# Patient Record
Sex: FEMALE | Race: WHITE | NOT HISPANIC OR LATINO
[De-identification: names, ages, dates, MRNs, and addresses within clinical notes are randomized per-mention and may not be internally consistent; named-entity substitution may affect disease eponyms.]

---

## 2017-04-04 ENCOUNTER — NON-APPOINTMENT (OUTPATIENT)
Age: 52
End: 2017-04-04

## 2017-04-04 ENCOUNTER — APPOINTMENT (OUTPATIENT)
Dept: CARDIOLOGY | Facility: CLINIC | Age: 52
End: 2017-04-04

## 2017-04-04 VITALS
BODY MASS INDEX: 25.34 KG/M2 | HEART RATE: 68 BPM | HEIGHT: 63 IN | WEIGHT: 143 LBS | SYSTOLIC BLOOD PRESSURE: 118 MMHG | DIASTOLIC BLOOD PRESSURE: 70 MMHG

## 2017-04-04 DIAGNOSIS — Z00.00 ENCOUNTER FOR GENERAL ADULT MEDICAL EXAMINATION W/OUT ABNORMAL FINDINGS: ICD-10-CM

## 2017-04-04 DIAGNOSIS — Z82.5 FAMILY HISTORY OF ASTHMA AND OTHER CHRONIC LOWER RESPIRATORY DISEASES: ICD-10-CM

## 2017-04-04 DIAGNOSIS — Z78.9 OTHER SPECIFIED HEALTH STATUS: ICD-10-CM

## 2017-04-04 DIAGNOSIS — Z80.3 FAMILY HISTORY OF MALIGNANT NEOPLASM OF BREAST: ICD-10-CM

## 2017-04-04 DIAGNOSIS — D18.03 HEMANGIOMA OF INTRA-ABDOMINAL STRUCTURES: ICD-10-CM

## 2017-04-04 DIAGNOSIS — Z86.69 PERSONAL HISTORY OF OTHER DISEASES OF THE NERVOUS SYSTEM AND SENSE ORGANS: ICD-10-CM

## 2017-04-04 DIAGNOSIS — Z87.19 PERSONAL HISTORY OF OTHER DISEASES OF THE DIGESTIVE SYSTEM: ICD-10-CM

## 2017-04-04 DIAGNOSIS — J45.909 UNSPECIFIED ASTHMA, UNCOMPLICATED: ICD-10-CM

## 2017-04-04 RX ORDER — PHENTERMINE AND TOPIRAMATE 11.25; 69 MG/1; MG/1
11.25-69 CAPSULE, EXTENDED RELEASE ORAL
Refills: 0 | Status: ACTIVE | COMMUNITY

## 2017-04-04 RX ORDER — NAPROXEN 500 MG/1
500 TABLET ORAL
Refills: 0 | Status: ACTIVE | COMMUNITY

## 2017-04-04 RX ORDER — MONTELUKAST SODIUM 10 MG/1
10 TABLET, FILM COATED ORAL
Refills: 0 | Status: ACTIVE | COMMUNITY

## 2017-04-04 RX ORDER — OMEPRAZOLE 40 MG/1
40 CAPSULE, DELAYED RELEASE ORAL
Refills: 0 | Status: ACTIVE | COMMUNITY

## 2017-04-04 RX ORDER — FEXOFENADINE HYDROCHLORIDE 180 MG/1
180 TABLET, FILM COATED ORAL
Refills: 0 | Status: ACTIVE | COMMUNITY

## 2017-04-12 ENCOUNTER — APPOINTMENT (OUTPATIENT)
Dept: CARDIOLOGY | Facility: CLINIC | Age: 52
End: 2017-04-12

## 2017-04-13 ENCOUNTER — RESULT REVIEW (OUTPATIENT)
Age: 52
End: 2017-04-13

## 2017-08-24 ENCOUNTER — APPOINTMENT (OUTPATIENT)
Dept: ORTHOPEDIC SURGERY | Facility: CLINIC | Age: 52
End: 2017-08-24
Payer: COMMERCIAL

## 2017-08-24 VITALS
WEIGHT: 140 LBS | HEIGHT: 63 IN | DIASTOLIC BLOOD PRESSURE: 80 MMHG | SYSTOLIC BLOOD PRESSURE: 121 MMHG | HEART RATE: 80 BPM | BODY MASS INDEX: 24.8 KG/M2

## 2017-08-24 PROCEDURE — 73564 X-RAY EXAM KNEE 4 OR MORE: CPT | Mod: LT

## 2017-08-24 PROCEDURE — 99203 OFFICE O/P NEW LOW 30 MIN: CPT

## 2017-10-03 ENCOUNTER — APPOINTMENT (OUTPATIENT)
Dept: CARDIOLOGY | Facility: CLINIC | Age: 52
End: 2017-10-03

## 2017-10-09 ENCOUNTER — TRANSCRIPTION ENCOUNTER (OUTPATIENT)
Age: 52
End: 2017-10-09

## 2017-11-01 ENCOUNTER — APPOINTMENT (OUTPATIENT)
Dept: CARDIOLOGY | Facility: CLINIC | Age: 52
End: 2017-11-01
Payer: COMMERCIAL

## 2017-11-01 ENCOUNTER — NON-APPOINTMENT (OUTPATIENT)
Age: 52
End: 2017-11-01

## 2017-11-01 VITALS — SYSTOLIC BLOOD PRESSURE: 124 MMHG | HEART RATE: 88 BPM | DIASTOLIC BLOOD PRESSURE: 78 MMHG

## 2017-11-01 DIAGNOSIS — R55 SYNCOPE AND COLLAPSE: ICD-10-CM

## 2017-11-01 DIAGNOSIS — R07.89 OTHER CHEST PAIN: ICD-10-CM

## 2017-11-01 PROCEDURE — 99214 OFFICE O/P EST MOD 30 MIN: CPT

## 2017-11-01 PROCEDURE — 93000 ELECTROCARDIOGRAM COMPLETE: CPT

## 2018-08-06 ENCOUNTER — APPOINTMENT (OUTPATIENT)
Dept: ORTHOPEDIC SURGERY | Facility: CLINIC | Age: 53
End: 2018-08-06
Payer: COMMERCIAL

## 2018-08-06 VITALS
BODY MASS INDEX: 25.69 KG/M2 | DIASTOLIC BLOOD PRESSURE: 71 MMHG | SYSTOLIC BLOOD PRESSURE: 112 MMHG | WEIGHT: 145 LBS | HEART RATE: 86 BPM | HEIGHT: 63 IN

## 2018-08-06 PROCEDURE — 20610 DRAIN/INJ JOINT/BURSA W/O US: CPT | Mod: LT

## 2018-08-06 PROCEDURE — 99214 OFFICE O/P EST MOD 30 MIN: CPT | Mod: 25

## 2018-08-20 ENCOUNTER — OTHER (OUTPATIENT)
Age: 53
End: 2018-08-20

## 2018-08-20 RX ORDER — HYALURONATE SODIUM 20 MG/2 ML
20 SYRINGE (ML) INTRAARTICULAR
Qty: 1 | Refills: 0 | Status: ACTIVE | OUTPATIENT
Start: 2018-08-20

## 2018-09-10 ENCOUNTER — APPOINTMENT (OUTPATIENT)
Dept: ORTHOPEDIC SURGERY | Facility: CLINIC | Age: 53
End: 2018-09-10

## 2018-09-20 ENCOUNTER — APPOINTMENT (OUTPATIENT)
Dept: ORTHOPEDIC SURGERY | Facility: CLINIC | Age: 53
End: 2018-09-20
Payer: COMMERCIAL

## 2018-09-20 PROCEDURE — 20610 DRAIN/INJ JOINT/BURSA W/O US: CPT | Mod: LT

## 2018-09-26 ENCOUNTER — APPOINTMENT (OUTPATIENT)
Dept: ORTHOPEDIC SURGERY | Facility: CLINIC | Age: 53
End: 2018-09-26
Payer: COMMERCIAL

## 2018-09-26 PROCEDURE — 20610 DRAIN/INJ JOINT/BURSA W/O US: CPT | Mod: LT

## 2018-10-02 ENCOUNTER — APPOINTMENT (OUTPATIENT)
Dept: ORTHOPEDIC SURGERY | Facility: CLINIC | Age: 53
End: 2018-10-02

## 2018-10-02 ENCOUNTER — APPOINTMENT (OUTPATIENT)
Dept: ORTHOPEDIC SURGERY | Facility: CLINIC | Age: 53
End: 2018-10-02
Payer: COMMERCIAL

## 2018-10-02 PROCEDURE — 20610 DRAIN/INJ JOINT/BURSA W/O US: CPT | Mod: LT

## 2020-12-12 ENCOUNTER — TRANSCRIPTION ENCOUNTER (OUTPATIENT)
Age: 55
End: 2020-12-12

## 2020-12-16 ENCOUNTER — APPOINTMENT (OUTPATIENT)
Dept: ORTHOPEDIC SURGERY | Facility: CLINIC | Age: 55
End: 2020-12-16
Payer: COMMERCIAL

## 2020-12-16 VITALS — TEMPERATURE: 97.2 F

## 2020-12-16 DIAGNOSIS — Z87.39 PERSONAL HISTORY OF OTHER DISEASES OF THE MUSCULOSKELETAL SYSTEM AND CONNECTIVE TISSUE: ICD-10-CM

## 2020-12-16 DIAGNOSIS — Z72.3 LACK OF PHYSICAL EXERCISE: ICD-10-CM

## 2020-12-16 DIAGNOSIS — M79.7 FIBROMYALGIA: ICD-10-CM

## 2020-12-16 DIAGNOSIS — G56.02 CARPAL TUNNEL SYNDROME, LEFT UPPER LIMB: ICD-10-CM

## 2020-12-16 DIAGNOSIS — Z86.69 PERSONAL HISTORY OF OTHER DISEASES OF THE NERVOUS SYSTEM AND SENSE ORGANS: ICD-10-CM

## 2020-12-16 DIAGNOSIS — M65.341 TRIGGER FINGER, RIGHT RING FINGER: ICD-10-CM

## 2020-12-16 DIAGNOSIS — G56.01 CARPAL TUNNEL SYNDROME, RIGHT UPPER LIMB: ICD-10-CM

## 2020-12-16 DIAGNOSIS — Z82.69 FAMILY HISTORY OF OTHER DISEASES OF THE MUSCULOSKELETAL SYSTEM AND CONNECTIVE TISSUE: ICD-10-CM

## 2020-12-16 PROCEDURE — 99072 ADDL SUPL MATRL&STAF TM PHE: CPT

## 2020-12-16 PROCEDURE — 99203 OFFICE O/P NEW LOW 30 MIN: CPT

## 2021-04-15 ENCOUNTER — APPOINTMENT (OUTPATIENT)
Dept: ORTHOPEDIC SURGERY | Facility: CLINIC | Age: 56
End: 2021-04-15
Payer: COMMERCIAL

## 2021-04-15 ENCOUNTER — NON-APPOINTMENT (OUTPATIENT)
Age: 56
End: 2021-04-15

## 2021-04-15 VITALS — BODY MASS INDEX: 27.49 KG/M2 | HEIGHT: 65 IN | WEIGHT: 165 LBS

## 2021-04-15 PROCEDURE — 73130 X-RAY EXAM OF HAND: CPT | Mod: 50

## 2021-04-15 PROCEDURE — 99072 ADDL SUPL MATRL&STAF TM PHE: CPT

## 2021-04-15 PROCEDURE — 20600 DRAIN/INJ JOINT/BURSA W/O US: CPT | Mod: F5

## 2021-04-15 PROCEDURE — 99213 OFFICE O/P EST LOW 20 MIN: CPT | Mod: 25

## 2021-04-16 NOTE — HISTORY OF PRESENT ILLNESS
[FreeTextEntry1] : GRACIE JOHNSON is aRHD  56 year female who presents for initial evaluation of bilateral basal joint arthritis.  She had left basal joint arthroplasty in January by Dr. Mandujano.  She found out after her post operative appointment that Dr. Mandujano is no longer practicing.  She has been attending hand therapy.  She presents for post-operative care.  She states she gets occasional pins and needles and a burning sensation in the fingertips in the left hand. She describes experiencing an achy pain, which she says could be due to the weather. She has not taken medication for pain due to stomach issues such as ulcers. \par \par She also c/o pain in the right basal joint.  She would like to have a right basal joint arthroplasty in the future. 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 She states she writes a lot and uses her hands, which increases her pain.

## 2021-04-16 NOTE — PHYSICAL EXAM
[de-identified] : Patient is WDWN, alert, and in no acute distress. Breathing is unlabored. She is grossly oriented to person, place, \par and time.\par \par Right Hand:\par Tenderness upon palpation of basal joint [de-identified] : AP, lateral and oblique views of the right hand were obtained today and revealed basal joint arthritis.	\par \par AP, lateral and oblique views of the left hand were obtained today and revealed left trapezium has been resected.

## 2021-04-16 NOTE — END OF VISIT
[FreeTextEntry3] : All medical record entries made by the Scribe were at my,  Dr. Jeanmarie Fuchs MD., direction and personally dictated by me on 04/15/2021. I have personally reviewed the chart and agree that the record accurately reflects my personal performance of the history, physical exam, assessment and plan.\par \par

## 2021-04-16 NOTE — DISCUSSION/SUMMARY
[FreeTextEntry1] : The underlying pathophysiology was reviewed with the patient. XR films were reviewed with the patient. Discussed at length the nature of the patient’s condition. Their right hand symptoms appear secondary to basal joint arthritis\par \par   The patient wishes to proceed with a cortisone injection at this time. Under sterile precautions, an injection of  0.5 cc 1% lidocaine with 0.25 cc of Kenalog and 0.25 cc of Dexamethasone was administered into the right basal joint.  The patient tolerated the procedure well. Rest and apply ice.   	\par \par The patient wishes to proceed with hand therapy of the left hand. A script was given.		 \par Topical analgesics as needed. \par NSAIDs as tolerated.\par \par Patient can continue activities as tolerated. All questions answered, understanding verbalized. Patient in agreement with plan of care.

## 2021-05-20 ENCOUNTER — APPOINTMENT (OUTPATIENT)
Dept: ORTHOPEDIC SURGERY | Facility: CLINIC | Age: 56
End: 2021-05-20
Payer: COMMERCIAL

## 2021-05-20 VITALS — HEIGHT: 62 IN | BODY MASS INDEX: 29.08 KG/M2 | WEIGHT: 158 LBS

## 2021-05-20 DIAGNOSIS — M18.12 UNILATERAL PRIMARY OSTEOARTHRITIS OF FIRST CARPOMETACARPAL JOINT, LEFT HAND: ICD-10-CM

## 2021-05-20 DIAGNOSIS — M18.11 UNILATERAL PRIMARY OSTEOARTHRITIS OF FIRST CARPOMETACARPAL JOINT, RIGHT HAND: ICD-10-CM

## 2021-05-20 PROCEDURE — 73130 X-RAY EXAM OF HAND: CPT | Mod: LT

## 2021-05-20 PROCEDURE — 99214 OFFICE O/P EST MOD 30 MIN: CPT

## 2021-05-20 PROCEDURE — 99072 ADDL SUPL MATRL&STAF TM PHE: CPT

## 2021-05-21 PROBLEM — M18.11 PRIMARY OSTEOARTHRITIS OF FIRST CARPOMETACARPAL JOINT OF RIGHT HAND: Status: ACTIVE | Noted: 2020-12-16

## 2021-05-21 NOTE — HISTORY OF PRESENT ILLNESS
[FreeTextEntry1] : GRACIE JOHNSON is aRHD 56 year female who presents for a follow up of bilateral basal joint arthritis. She had left basal joint arthroplasty in January by Dr. Mandujano. She found out after her post operative appointment that Dr. Mandujano is no longer practicing. She has been attending hand therapy. She presents today for left hand pain. She has tightness in her thumb and wrist. She has struggled with flexibility. She tries not to grab the steering wheel with her left hand since it causes pain. She has gotten a vertical mouse at work to help with the pain. She states she gets occasional pins and needles and a burning sensation in the fingertips in the left hand. She has not taken medication for pain due to stomach issues such as ulcers. Uses heat and massage for pain. Avoids lifting heavy things but is able to garden. Does not wear any brace but used to wear the carpel tunnel braces. \par \par

## 2021-05-21 NOTE — DISCUSSION/SUMMARY
[FreeTextEntry1] : The underlying pathophysiology was reviewed with the patient. XR films were reviewed with the patient. Discussed at length the nature of the patient’s condition. \par \par Advised to use a brace for support if it is comfortable. \par Topical analgesics as needed. \par Continue occupational therapy. \par Informed her that these symptoms are normal. \par \par Patient can continue activities as tolerated. All questions answered, understanding verbalized. Patient in agreement with plan of care. Patient may follow up every 3 months as needed.

## 2021-05-21 NOTE — ADDENDUM
[FreeTextEntry1] : I, Tasha Grimes wrote this note acting as a scribe for Dr. Jeanmarie Fuchs on May 20, 2021.\par \par \par \par

## 2021-05-21 NOTE — END OF VISIT
[FreeTextEntry3] : All medical record entries made by the Scribe were at my,  Dr. Jeanmarie Fuchs MD., direction and personally dictated by me on 05/20/2021. I have personally reviewed the chart and agree that the record accurately reflects my personal performance of the history, physical exam, assessment and plan.

## 2021-06-14 ENCOUNTER — OUTPATIENT (OUTPATIENT)
Dept: OUTPATIENT SERVICES | Facility: HOSPITAL | Age: 56
LOS: 1 days | End: 2021-06-14
Payer: COMMERCIAL

## 2021-06-14 ENCOUNTER — APPOINTMENT (OUTPATIENT)
Dept: MRI IMAGING | Facility: CLINIC | Age: 56
End: 2021-06-14
Payer: COMMERCIAL

## 2021-06-14 DIAGNOSIS — Z00.8 ENCOUNTER FOR OTHER GENERAL EXAMINATION: ICD-10-CM

## 2021-06-14 PROCEDURE — 73718 MRI LOWER EXTREMITY W/O DYE: CPT | Mod: 26,RT

## 2021-06-14 PROCEDURE — 73718 MRI LOWER EXTREMITY W/O DYE: CPT

## 2021-09-27 ENCOUNTER — APPOINTMENT (OUTPATIENT)
Dept: ORTHOPEDIC SURGERY | Facility: CLINIC | Age: 56
End: 2021-09-27

## 2022-01-20 ENCOUNTER — TRANSCRIPTION ENCOUNTER (OUTPATIENT)
Age: 57
End: 2022-01-20

## 2022-02-27 NOTE — ADDENDUM
Neuro: A&Ox4.   Cardiac: ST. VSS.   Respiratory: Sating 97% on RA-1L.   GI/: Adequate urine output. BM X1  Diet/appetite: Advanced to regular diet, tolerating fair. Not much appetite this am.   Activity:  Assist of 1, up to chair most of shift.   Pain: Pt still sore from CT insertions yesterday. Robaxin and tylenol given this am and oxycodone given x1 this afternoon.   Skin: No new deficits noted. Incision is healing well. Right CT site and old CT sites dressing changed. All WNL.   LDA's: PIV x1, NJ with TF and CT x3 to suction.     Plan: Pt placed on airborne precautions d/t herpes simplex growing from broch. Not known what isolation is needed, Infection prevention will be addressing Monday. Pt will move to negative isolation room when cleaned. Continue with POC. Notify primary team with changes.  at bedside, very helpful with cares.    [FreeTextEntry1] : I, Tasha Grimes wrote this note acting as a scribe for Dr. Jeanmarie Fuchs on Apr 15, 2021.\par \par

## 2022-04-25 ENCOUNTER — FORM ENCOUNTER (OUTPATIENT)
Age: 57
End: 2022-04-25

## 2022-05-11 NOTE — PHYSICAL EXAM
"Subjective     Tata Nettles is a 53 y.o. female who presents with   Chief Complaint   Patient presents with   • Rectal Bleeding       History of Present Illness     C/o rectal bleeding going on for several weeks.  BMs are hard.  Uses Miralax as needed.  Rectal bleeding with bowel movements.  She feels something on outside of the left side of rectum.  It is draining.        Review of Systems   Respiratory: Negative.    Cardiovascular: Negative.    Gastrointestinal: Positive for constipation.       The following portions of the patient's history were reviewed and updated as appropriate: allergies, current medications and problem list.    Patient Active Problem List    Diagnosis Date Noted   • Posterior tibial tendon dysfunction 04/12/2018   • Flat foot 04/12/2018   • Hormone replacement therapy (HRT) 08/31/2017   • Breast cyst 02/02/2016       Current Outpatient Medications on File Prior to Visit   Medication Sig Dispense Refill   • escitalopram (Lexapro) 5 MG tablet Take 1 tablet by mouth Daily. 30 tablet 2   • estrogen, conjugated,-medroxyprogesterone (Prempro) 0.3-1.5 MG per tablet Take 1 tablet by mouth Daily. 90 tablet 3     No current facility-administered medications on file prior to visit.       Objective     /100   Pulse 78   Ht 162.6 cm (64.02\")   Wt 71.2 kg (157 lb)   LMP 09/01/2018 Comment: some bleeding/switching hormones  SpO2 98%   BMI 26.94 kg/m²     Physical Exam  Constitutional:       Appearance: She is well-developed.   HENT:      Head: Normocephalic and atraumatic.   Pulmonary:      Effort: Pulmonary effort is normal.   Genitourinary:     Rectum: Normal. No mass, tenderness or external hemorrhoid. Normal anal tone.      Comments: Area of swelling and induration to the left of the rectum with trace amount of purulent drainage.    Neurological:      Mental Status: She is alert and oriented to person, place, and time.   Psychiatric:         Behavior: Behavior normal.         Assessment " & Plan   Diagnoses and all orders for this visit:    1. Rectal bleeding (Primary)  -     Cancel: Ambulatory Referral to General Surgery  -     Ambulatory Referral to General Surgery    2. Abscess, perineum  -     Ambulatory Referral to General Surgery    Other orders  -     sulfamethoxazole-trimethoprim (Bactrim DS) 800-160 MG per tablet; Take 1 tablet by mouth 2 (Two) Times a Day.  Dispense: 14 tablet; Refill: 0        Discussion    Patient presents with intermittent rectal bleeding.  We discussed increased attention to water, fiber, stool softener and dedicated time for bowel movements.    Small perineum abscess draining.  Sitz baths are recommended.  Rx for Bactrim.  Refer to general surgery for opinion about both rectal bleeding and abscess.         Future Appointments   Date Time Provider Department Center   6/20/2022  3:30 PM [facility]   6/27/2022  8:15 AM Abimbola Rodriguez MD [facility]   2/15/2023  4:15 PM Aimee Ordoñez MD [facility]          [de-identified] : Patient is WDWN, alert, and in no acute distress. Breathing is unlabored. She is grossly oriented to person, place, \par and time.\par \par Right Hand:\par Tenderness upon palpation of basal joint. \par Full ROM \par Edema present [de-identified] : AP, lateral and oblique views of the left hand were obtained today and revealed  trapezial resection.

## 2022-07-27 ENCOUNTER — APPOINTMENT (OUTPATIENT)
Dept: ORTHOPEDIC SURGERY | Facility: CLINIC | Age: 57
End: 2022-07-27

## 2022-07-27 VITALS — BODY MASS INDEX: 29.08 KG/M2 | HEIGHT: 62 IN | WEIGHT: 158 LBS

## 2022-07-27 PROCEDURE — 99213 OFFICE O/P EST LOW 20 MIN: CPT

## 2022-07-27 PROCEDURE — 73564 X-RAY EXAM KNEE 4 OR MORE: CPT | Mod: LT

## 2022-07-27 RX ORDER — HYALURONATE SODIUM 20 MG/2 ML
20 SYRINGE (ML) INTRAARTICULAR
Qty: 1 | Refills: 0 | Status: ACTIVE | COMMUNITY
Start: 2022-07-27

## 2022-07-28 ENCOUNTER — FORM ENCOUNTER (OUTPATIENT)
Age: 57
End: 2022-07-28

## 2022-07-28 NOTE — ADDENDUM
[FreeTextEntry1] : This note was written by Velma Rowley on 07/27/2022 acting solely as a scribe for Dr. Neal Villarreal.\par \par All medical record entries made by the Scribe were at my, Dr. Neal Villarreal, direction and personally dictated by me on 07/27/2022. I have personally reviewed the chart and agree that the record accurately reflects my personal performance of the history, physical exam, assessment and plan.

## 2022-07-28 NOTE — PHYSICAL EXAM
[de-identified] : General: well-appearing, not in acute distress and not obese. \par Gait: normal. \par Oriented to time, place, person\par Mood: Normal\par Affect: Normal\par \par Left knee exam\par \par Skin: Clean, dry, intact\par Inspection: No obvious malalignment, no masses, mild swelling, mild effusion\par Pulses: 2+ DP/PT pulses\par ROM: 0-125 degrees of flexion. Global pain with deep knee flexion/extension.\par Tenderness: Positive MJLT. Positive LJLT. Mild pain over the patella facets. No pain to the quadriceps tendon. No pain to the patella tendon. No posterior knee tenderness.\par Stability: Stable to varus, valgus. Negative lachman testing. Negative anterior drawer, negative posterior drawer.\par Strength: 5/5 Q/H/TA/GS/EHL, without atrophy\par Neuro: In tact to light touch throughout, DTR's normal\par Additional tests: + McMurrays test, crepitus w patellar grind test. \par  [de-identified] : The following radiographs were ordered and read by me during this patients visit. I reviewed each radiograph in detail with the patient and discussed the findings as highlighted below. \par \par 4 views of the left knee were obtained 7.27.22 that show no acute fracture or dislocation. There is mild medial, no lateral and min patellofemoral degenerative change seen. There is no significant malalignment. No significant other obvious osseous abnormality, mild chondrocalcinosis.\par \par MRI left knee dated 8.8.17 shows degenerative tearing medial and lateral meniscus with truncation consistent with prior surgery. Posterior lateral horn attachment. Anterior cruciate ligament cyst. Associated arthrosis.

## 2022-07-28 NOTE — HISTORY OF PRESENT ILLNESS
[de-identified] : 57 year old female s/p left knee meniscus repair in her 20s presents today with left knee pain x 2 weeks. She slipped on wet grass and fell on her knee. She was seen in 2018 and given Euflexxa injection which was helpful until this fall. The pain is brought on when kneeling, getting out of a chair. Reports clicking, and popping.  Denies numbness or tingling.  \par \par The patient's past medical history, past surgical history, medications and allergies were reviewed by me today with the patient and documented accordingly. In addition, the patient's family and social history, which were noncontributory to this visit, were reviewed also.

## 2022-07-28 NOTE — DISCUSSION/SUMMARY
[de-identified] : 56 y/o female with left knee osteoarthritis. \par \par Patient has known degenerative arthrosis within the left knee from prior partial meniscectomy. Recent exacerbation with swelling and discomfort. Recommend trial of Visco supplementation injection therapy given previous improvements from intra-articular injection in 2018. We'll obtain preauthorization prior to injection. Patient is not a candidate for intra-articular steroid injection or arthroplasty at this time. \par \par Recommendations: NSAIDs/Ice prn. \par \par Followup: Once approved for HA injection therapy.

## 2022-08-15 ENCOUNTER — APPOINTMENT (OUTPATIENT)
Dept: ORTHOPEDIC SURGERY | Facility: CLINIC | Age: 57
End: 2022-08-15

## 2022-08-15 PROCEDURE — 20610 DRAIN/INJ JOINT/BURSA W/O US: CPT | Mod: LT

## 2022-08-16 NOTE — ADDENDUM
[FreeTextEntry1] : This note was written by Velma Rowley on 08/15/2022 acting solely as a scribe for Dr. Neal Villarreal.\par \par All medical record entries made by the Scribe were at my, Dr. Neal Villarreal, direction and personally dictated by me on 08/15/2022. I have personally reviewed the chart and agree that the record accurately reflects my personal performance of the history, physical exam, assessment and plan.

## 2022-08-16 NOTE — END OF VISIT
[FreeTextEntry3] : 56 y/o female with left knee OA. \par \par The first of three Euflexxa injections was given today under sterile conditions into the left knee joint without complication. The patient was instructed on modification of activities over the next 48-72 hours. I advised the patient to ice the knee as needed for control of local irritation from the injection. I advised that some patients have immediate benefit from the initial injection therapy, however it usually takes the medication a number of weeks (~8wks) to provide significant relief of osteoarthritic symptoms. \par \par We will see the patient back for the second injection in a weeks time.

## 2022-08-16 NOTE — PROCEDURE
[de-identified] : Injection: Left knee joint.\par Indication: Osteoarthritis. \par \par A discussion was had with the patient regarding this procedure and all questions were answered. All risks, benefits and alternatives were discussed. These included but were not limited to bleeding, infection, and allergic reaction. Alcohol was used to clean the skin, and betadine was used to sterilize and prep the area in the supero-lateral aspect of the left knee. Ethyl chloride spray was then used as a topical anesthetic. A 21-gauge needle was used to inject 2 cc of Euflexxa into the knee. A sterile bandage was then applied. The patient tolerated the procedure well and there were no complications.

## 2022-08-29 ENCOUNTER — APPOINTMENT (OUTPATIENT)
Dept: ORTHOPEDIC SURGERY | Facility: CLINIC | Age: 57
End: 2022-08-29

## 2022-08-29 PROCEDURE — 20610 DRAIN/INJ JOINT/BURSA W/O US: CPT | Mod: LT

## 2022-08-30 NOTE — PROCEDURE
[de-identified] : Injection: Left knee joint.\par Indication: Osteoarthritis. \par \par A discussion was had with the patient regarding this procedure and all questions were answered. All risks, benefits and alternatives were discussed. These included but were not limited to bleeding, infection, and allergic reaction. Alcohol was used to clean the skin, and betadine was used to sterilize and prep the area in the supero-lateral aspect of the left knee. Ethyl chloride spray was then used as a topical anesthetic. A 21-gauge needle was used to inject Euflexxa into the knee. A sterile bandage was then applied. The patient tolerated the procedure well and there were no complications.

## 2022-08-30 NOTE — END OF VISIT
[FreeTextEntry3] : 58 y/o female with left knee OA. \par \par The second of three Euflexxa injections was given today under sterile conditions into the left knee joint without complication. I again discussed the role of activity modification/icing following the injection to treat any local irritation from the injection.\par \par We'll have the patient followup for the final injection next week.

## 2022-08-30 NOTE — ADDENDUM
[FreeTextEntry1] : This note was written by Velma Rowley on 08/29/2022 acting solely as a scribe for Dr. Neal Villarreal.\par \par All medical record entries made by the Scribe were at my, Dr. Neal Villarreal, direction and personally dictated by me on 08/29/2022. I have personally reviewed the chart and agree that the record accurately reflects my personal performance of the history, physical exam, assessment and plan.

## 2022-09-06 ENCOUNTER — APPOINTMENT (OUTPATIENT)
Dept: ORTHOPEDIC SURGERY | Facility: CLINIC | Age: 57
End: 2022-09-06

## 2022-09-06 DIAGNOSIS — M17.12 UNILATERAL PRIMARY OSTEOARTHRITIS, LEFT KNEE: ICD-10-CM

## 2022-09-06 PROCEDURE — 20610 DRAIN/INJ JOINT/BURSA W/O US: CPT | Mod: LT

## 2022-09-07 PROBLEM — M17.12 PRIMARY OSTEOARTHRITIS OF LEFT KNEE: Status: ACTIVE | Noted: 2017-09-05

## 2022-09-07 NOTE — END OF VISIT
[FreeTextEntry3] : 56 y/o female with left knee OA. \par \par The final Euflexxa injection was given today under sterile conditions into the left knee joint without complication. I discussed the effects of this medication and how long it may provide benefit. Patient has obtained moderate immediate improvement. If no significant long-term benefit, the patient may elect for additional treatment strategies as previously discussed. However, if the patient obtains good relief of symptoms, the injection therapy series can be repeated over the next 6-12 months.\par \par We'll have the patient followup on an as-needed basis at this time.

## 2022-09-07 NOTE — ADDENDUM
[FreeTextEntry1] : This note was written by Velma Rowley on 09/06/2022 acting solely as a scribe for Dr. Neal Villarreal.\par \par All medical record entries made by the Scribe were at my, Dr. Neal Villarreal, direction and personally dictated by me on 09/06/2022. I have personally reviewed the chart and agree that the record accurately reflects my personal performance of the history, physical exam, assessment and plan.

## 2022-09-07 NOTE — PROCEDURE
[de-identified] : Injection: Left knee joint.\par Indication: Osteoarthritis. \par \par A discussion was had with the patient regarding this procedure and all questions were answered. All risks, benefits and alternatives were discussed. These included but were not limited to bleeding, infection, and allergic reaction. Alcohol was used to clean the skin, and betadine was used to sterilize and prep the area in the supero-lateral aspect of the left knee. Ethyl chloride spray was then used as a topical anesthetic. A 21-gauge needle was used to inject Euflexxa into the knee. A sterile bandage was then applied. The patient tolerated the procedure well and there were no complications.

## 2022-11-16 ENCOUNTER — OUTPATIENT (OUTPATIENT)
Dept: OUTPATIENT SERVICES | Facility: HOSPITAL | Age: 57
LOS: 1 days | End: 2022-11-16
Payer: COMMERCIAL

## 2022-11-16 VITALS
TEMPERATURE: 98 F | RESPIRATION RATE: 17 BRPM | OXYGEN SATURATION: 99 % | HEIGHT: 63 IN | SYSTOLIC BLOOD PRESSURE: 138 MMHG | DIASTOLIC BLOOD PRESSURE: 89 MMHG | WEIGHT: 139.99 LBS | HEART RATE: 67 BPM

## 2022-11-16 DIAGNOSIS — M65.311 TRIGGER THUMB, RIGHT THUMB: ICD-10-CM

## 2022-11-16 DIAGNOSIS — Z98.890 OTHER SPECIFIED POSTPROCEDURAL STATES: Chronic | ICD-10-CM

## 2022-11-16 DIAGNOSIS — Z01.818 ENCOUNTER FOR OTHER PREPROCEDURAL EXAMINATION: ICD-10-CM

## 2022-11-16 LAB
ANION GAP SERPL CALC-SCNC: 5 MMOL/L — SIGNIFICANT CHANGE UP (ref 5–17)
BUN SERPL-MCNC: 15 MG/DL — SIGNIFICANT CHANGE UP (ref 7–23)
CALCIUM SERPL-MCNC: 9.2 MG/DL — SIGNIFICANT CHANGE UP (ref 8.5–10.1)
CHLORIDE SERPL-SCNC: 110 MMOL/L — HIGH (ref 96–108)
CO2 SERPL-SCNC: 27 MMOL/L — SIGNIFICANT CHANGE UP (ref 22–31)
CREAT SERPL-MCNC: 0.93 MG/DL — SIGNIFICANT CHANGE UP (ref 0.5–1.3)
EGFR: 72 ML/MIN/1.73M2 — SIGNIFICANT CHANGE UP
GLUCOSE SERPL-MCNC: 77 MG/DL — SIGNIFICANT CHANGE UP (ref 70–99)
HCT VFR BLD CALC: 39.3 % — SIGNIFICANT CHANGE UP (ref 34.5–45)
HGB BLD-MCNC: 13.7 G/DL — SIGNIFICANT CHANGE UP (ref 11.5–15.5)
MCHC RBC-ENTMCNC: 33.9 PG — SIGNIFICANT CHANGE UP (ref 27–34)
MCHC RBC-ENTMCNC: 34.9 GM/DL — SIGNIFICANT CHANGE UP (ref 32–36)
MCV RBC AUTO: 97.3 FL — SIGNIFICANT CHANGE UP (ref 80–100)
NRBC # BLD: 0 /100 WBCS — SIGNIFICANT CHANGE UP (ref 0–0)
PLATELET # BLD AUTO: 260 K/UL — SIGNIFICANT CHANGE UP (ref 150–400)
POTASSIUM SERPL-MCNC: 4 MMOL/L — SIGNIFICANT CHANGE UP (ref 3.5–5.3)
POTASSIUM SERPL-SCNC: 4 MMOL/L — SIGNIFICANT CHANGE UP (ref 3.5–5.3)
RBC # BLD: 4.04 M/UL — SIGNIFICANT CHANGE UP (ref 3.8–5.2)
RBC # FLD: 12.5 % — SIGNIFICANT CHANGE UP (ref 10.3–14.5)
SODIUM SERPL-SCNC: 142 MMOL/L — SIGNIFICANT CHANGE UP (ref 135–145)
WBC # BLD: 4.47 K/UL — SIGNIFICANT CHANGE UP (ref 3.8–10.5)
WBC # FLD AUTO: 4.47 K/UL — SIGNIFICANT CHANGE UP (ref 3.8–10.5)

## 2022-11-16 PROCEDURE — 93010 ELECTROCARDIOGRAM REPORT: CPT

## 2022-11-16 PROCEDURE — G0463: CPT

## 2022-11-16 PROCEDURE — 93005 ELECTROCARDIOGRAM TRACING: CPT

## 2022-11-16 PROCEDURE — 80048 BASIC METABOLIC PNL TOTAL CA: CPT

## 2022-11-16 PROCEDURE — 36415 COLL VENOUS BLD VENIPUNCTURE: CPT

## 2022-11-16 PROCEDURE — 85027 COMPLETE CBC AUTOMATED: CPT

## 2022-11-16 NOTE — H&P PST ADULT - NSICDXPASTSURGICALHX_GEN_ALL_CORE_FT
PAST SURGICAL HISTORY:  H/O hand surgery     H/O nasal septoplasty     History of Right Breast Implant     S/P Arthroscopic Knee Surgery

## 2022-11-16 NOTE — H&P PST ADULT - HISTORY OF PRESENT ILLNESS
This 56 yo f with a significant PMHX asthma, multiple allergies DDD presents to PST for a scheduled right thumb carpometacarpal arthroplasty right thumb trigger release right de quervain's release poss tendon transfer with mini fluoro with Dr. Dougherty. Pt is electing to have this procedure.

## 2022-11-16 NOTE — H&P PST ADULT - ALLERGY TYPES
outdoor environmental allergies/indoor environmental allergies/reactions to medicines/reactions to food/reactions to animals/reactions to insect bites

## 2022-11-16 NOTE — H&P PST ADULT - PROBLEM/PLAN-1
Here for rule out labor and decreased FM.  Asked about COVID S+S and stated that she felt SOB and had a cough. Placed in doplette precautions. Very concessive and no SOB noted. Pulse % and VSS. No cough noted. Having a few contractions. Patient stated that the contractions have decreased in frequency since arrival to the hospital. FHT's 130 with moderate variability and accelerations. No decelerations noted. Reactive NST. Cervix is closed thick and high. Membranes intact and no bleeding. Discharged home with kick counts and written/verbal instructions.   DISPLAY PLAN FREE TEXT

## 2022-11-16 NOTE — H&P PST ADULT - NSICDXPASTMEDICALHX_GEN_ALL_CORE_FT
PAST MEDICAL HISTORY:  Asthma     Cervical Radiculopathy     GERD (Gastroesophageal Reflux Disease)     Lumbar Radiculopathy

## 2022-11-16 NOTE — H&P PST ADULT - PROBLEM SELECTOR PLAN 1
PST: CBC, CMP, EKG  Medical evaluation with Dr. Sanz  Preoperative instructions reviewed including Dial antibacterial wash which will replace CHG due to pts skin status.   Avoid all NSAID/ASA containing products as well as all herbal/vitamin supplements. Tylenol only for pain/headache.   Fully vaccinated; Denies recent travel, recent illness and sick contact with COVID in the last 3 weeks Covid swab at [facility], date TBD

## 2022-11-16 NOTE — H&P PST ADULT - ASSESSMENT
This 58 yo f with a significant PMHX asthma, multiple allergies DDD presents to PST for a scheduled right thumb carpometacarpal arthroplasty right thumb trigger release right de quervain's release poss tendon transfer with mini fluoro with Dr. Dougherty

## 2022-11-16 NOTE — H&P PST ADULT - NSANTHOSAYNRD_GEN_A_CORE
No. LADI screening performed.  STOP BANG Legend: 0-2 = LOW Risk; 3-4 = INTERMEDIATE Risk; 5-8 = HIGH Risk

## 2022-11-25 ENCOUNTER — OUTPATIENT (OUTPATIENT)
Dept: OUTPATIENT SERVICES | Facility: HOSPITAL | Age: 57
LOS: 1 days | End: 2022-11-25
Payer: COMMERCIAL

## 2022-11-25 DIAGNOSIS — Z98.890 OTHER SPECIFIED POSTPROCEDURAL STATES: Chronic | ICD-10-CM

## 2022-11-25 DIAGNOSIS — Z20.828 CONTACT WITH AND (SUSPECTED) EXPOSURE TO OTHER VIRAL COMMUNICABLE DISEASES: ICD-10-CM

## 2022-11-25 LAB — SARS-COV-2 RNA SPEC QL NAA+PROBE: SIGNIFICANT CHANGE UP

## 2022-11-25 PROCEDURE — U0003: CPT

## 2022-11-25 PROCEDURE — U0005: CPT

## 2022-11-25 RX ORDER — SODIUM CHLORIDE 9 MG/ML
1000 INJECTION, SOLUTION INTRAVENOUS
Refills: 0 | Status: DISCONTINUED | OUTPATIENT
Start: 2022-11-28 | End: 2022-11-28

## 2022-11-25 NOTE — ASU PATIENT PROFILE, ADULT - SITE
Right Thumb Carpometacarpal Arthroplasty- Right Thumb Trigger Release- Right Dequervains Release- Poss Tendon Transfer W/ Mini Fluoro

## 2022-11-27 ENCOUNTER — TRANSCRIPTION ENCOUNTER (OUTPATIENT)
Age: 57
End: 2022-11-27

## 2022-11-28 ENCOUNTER — TRANSCRIPTION ENCOUNTER (OUTPATIENT)
Age: 57
End: 2022-11-28

## 2022-11-28 ENCOUNTER — RESULT REVIEW (OUTPATIENT)
Age: 57
End: 2022-11-28

## 2022-11-28 ENCOUNTER — OUTPATIENT (OUTPATIENT)
Dept: OUTPATIENT SERVICES | Facility: HOSPITAL | Age: 57
LOS: 1 days | End: 2022-11-28
Payer: COMMERCIAL

## 2022-11-28 VITALS
HEIGHT: 63 IN | OXYGEN SATURATION: 96 % | WEIGHT: 139.99 LBS | DIASTOLIC BLOOD PRESSURE: 73 MMHG | SYSTOLIC BLOOD PRESSURE: 130 MMHG | TEMPERATURE: 98 F | HEART RATE: 79 BPM | RESPIRATION RATE: 14 BRPM

## 2022-11-28 VITALS
DIASTOLIC BLOOD PRESSURE: 62 MMHG | OXYGEN SATURATION: 97 % | SYSTOLIC BLOOD PRESSURE: 110 MMHG | RESPIRATION RATE: 18 BRPM | HEART RATE: 82 BPM

## 2022-11-28 DIAGNOSIS — M65.311 TRIGGER THUMB, RIGHT THUMB: ICD-10-CM

## 2022-11-28 DIAGNOSIS — Z01.818 ENCOUNTER FOR OTHER PREPROCEDURAL EXAMINATION: ICD-10-CM

## 2022-11-28 DIAGNOSIS — Z98.890 OTHER SPECIFIED POSTPROCEDURAL STATES: Chronic | ICD-10-CM

## 2022-11-28 PROCEDURE — 88311 DECALCIFY TISSUE: CPT | Mod: 26

## 2022-11-28 PROCEDURE — 26055 INCISE FINGER TENDON SHEATH: CPT | Mod: F5

## 2022-11-28 PROCEDURE — C1713: CPT

## 2022-11-28 PROCEDURE — 88304 TISSUE EXAM BY PATHOLOGIST: CPT

## 2022-11-28 PROCEDURE — 25447 ARTHRP NTRCRP/CRP/MTCR NTRPS: CPT | Mod: RT

## 2022-11-28 PROCEDURE — 64721 CARPAL TUNNEL SURGERY: CPT | Mod: RT

## 2022-11-28 PROCEDURE — 25000 INCISION OF TENDON SHEATH: CPT | Mod: RT

## 2022-11-28 PROCEDURE — 88304 TISSUE EXAM BY PATHOLOGIST: CPT | Mod: 26

## 2022-11-28 PROCEDURE — 76000 FLUOROSCOPY <1 HR PHYS/QHP: CPT

## 2022-11-28 PROCEDURE — 88311 DECALCIFY TISSUE: CPT

## 2022-11-28 DEVICE — ARTHREX INTERNALBRACE HAND/WRIST: Type: IMPLANTABLE DEVICE | Status: FUNCTIONAL

## 2022-11-28 RX ORDER — SODIUM CHLORIDE 9 MG/ML
1000 INJECTION, SOLUTION INTRAVENOUS
Refills: 0 | Status: DISCONTINUED | OUTPATIENT
Start: 2022-11-28 | End: 2022-11-28

## 2022-11-28 RX ORDER — FENTANYL CITRATE 50 UG/ML
50 INJECTION INTRAVENOUS
Refills: 0 | Status: DISCONTINUED | OUTPATIENT
Start: 2022-11-28 | End: 2022-11-28

## 2022-11-28 RX ORDER — METOCLOPRAMIDE HCL 10 MG
5 TABLET ORAL ONCE
Refills: 0 | Status: DISCONTINUED | OUTPATIENT
Start: 2022-11-28 | End: 2022-11-28

## 2022-11-28 RX ORDER — CEFAZOLIN SODIUM 1 G
2000 VIAL (EA) INJECTION ONCE
Refills: 0 | Status: COMPLETED | OUTPATIENT
Start: 2022-11-28 | End: 2022-11-28

## 2022-11-28 RX ADMIN — SODIUM CHLORIDE 100 MILLILITER(S): 9 INJECTION, SOLUTION INTRAVENOUS at 16:56

## 2022-11-28 NOTE — ASU DISCHARGE PLAN (ADULT/PEDIATRIC) - CARE PROVIDER_API CALL
Da Dougherty (MD)  Plastic Surgery; Surgery of the Hand  [address], [address]  [place] [address]  Phone: [phone]  Fax: [phone]  Established Patient  Follow Up Time:

## 2022-11-28 NOTE — BRIEF OPERATIVE NOTE - OPERATION/FINDINGS
RIght thumb trigger release, right carpal tunnel release, right deQuervains release, right trapeziectomy and CMC arthroplasty with intern brace suspensionplasty.  SPlint

## 2022-11-28 NOTE — ASU DISCHARGE PLAN (ADULT/PEDIATRIC) - FOLLOW UP APPOINTMENTS
Patient calls to office - requests call - has questions on lab results
Pt has bloodwork orders from 02/28/19, does not have orders for CBC, only Comprehensive. Would like to  orders. Printed placed ;up front.
[facility], Ambulatory Surgery Unit

## 2022-11-28 NOTE — BRIEF OPERATIVE NOTE - NSICDXBRIEFPREOP_GEN_ALL_CORE_FT
Abdominal Pain and Early Pregnancy    (To rule out ectopic pregnancy or miscarriage)  Our tests show that you are pregnant, but the exact cause of your pain isn’t clear.  Some pain and bleeding are common early in pregnancy. Often they stop, and you can go on to have a normal pregnancy and baby. Other times the pain or bleeding can be signs of a miscarriage or ectopic pregnancy. An ectopic pregnancy is a very serious problem. At this time it is unclear if your pregnancy will continue normally, if you will have a miscarriage, or if you could have an ectopic pregnancy. Below is some information about this.  Miscarriage  At this time we don’t know whether you will have a miscarriage, or if things will clear up and your pregnancy will continue normally. We understand that this is emotionally difficult. There is little we can say to change the way you feel. But understand that miscarriages are common.  About 1 or 2 out of every 10 pregnancies end this way. Some end even before you know you are pregnant. This happens for a number of reasons, and usually we never figure out why. It’s important you know that it is not your fault. It didn’t happen because you did anything wrong.  Having sex or exercising does not cause a miscarriage. These activities are usually safe unless you have pain or bleeding or your doctor tells you to stop. Even minor falls won’t cause a miscarriage. Miscarriages happen because things were not developing as they were supposed to. No medicine can prevent a miscarriage.  Ectopic pregnancy  In a normal pregnancy, the fertilized egg attaches to the wall of the womb (uterus). In an ectopic or tubal pregnancy, the fertilized egg attaches outside the uterus, usually in the fallopian tube. Very rarely, the egg attaches to an ovary or somewhere else in the abdomen. An ectopic pregnancy is much less common than a miscarriage, but it is very serious. The baby cannot survive, and as it grows it can rupture  the tube. This can cause internal bleeding and even death. Risk factors for an ectopic are:  · An ectopic pregnancy in the past  · Pelvic inflammatory disease, or PID  · Endometriosis  · Smoking  · An IUD  Additional tests  Because we don’t know what’s causing your symptoms, you will need more tests to help your doctor figure out what the problem is. You may need:  Ultrasound  An ultrasound can usually find a normal pregnancy as early as 4 to 5 weeks along. If the ultrasound does not show the baby inside the uterus, it means that:  · You have a normal pregnancy less than 4 weeks along, or  · You are having or recently had a miscarriage, or  · You have an ectopic pregnancy  Quantitative HCG  This test measures the amount of a pregnancy hormone in your blood. Comparing today's test result to a repeat test in 2 days will show whether you have a normal pregnancy.  Laparoscopy  This is a type of surgery. The doctor will put a tube with a light inside your belly (abdomen) to look directly at your pelvic organs. This test is used when it is not safe to wait 2 days for blood test results.     Important information  If you do have an ectopic pregnancy, there is a small chance that the growing fetus can tear the fallopian tube. This can cause severe internal bleeding. If this happens, you may have:  · Sudden severe pain in your lower abdomen  · Vaginal bleeding  · Weakness, dizziness, and sometimes fainting  If any of these symptoms occur:  · Call 911 or return immediately to the hospital.  · Do not drive yourself.  · Do not go to your doctor's office or to a clinic - go to the hospital.      Home care  Follow these guidelines to help care for yourself at home:  · Rest until your next exam. Don’t do anything strenuous.  · Eat a light diet with foods that are easy to digest.  · Don’t have sex until your doctor says it’s OK.  Follow-up care  Follow up with your doctor for repeat blood testing. If you were told to have a repeat  blood test in 2 days, it’s important to get it done.  If you had an X-ray or ultrasound, a radiologist will review it. You will be told of any new findings that may affect your care.  Call 911  Call 911 if you have any of these:  · Severe pain and very heavy bleeding  · Severe lightheadedness, passing out, or fainting  · Rapid heart rate  · Trouble breathing  · Confused or difficulty waking up  When to seek medical advice  Call your health care provider right away  if any of these occur:  · The pain in your abdomen gets worse, either suddenly or gradually.  · You are dizzy or weak when you stand.  · You have heavy vaginal bleeding. This means soaking 1 pad an hour for 3 hours.  · You have vaginal bleeding for more than 5 days.  · You have repeated vomiting or diarrhea.  · The pain in your abdomen moves to the lower right.  · You have blood in your vomit or bowel movements. This will be dark red or black.  · You have a fever of 100.4ºF (38ºC) or higher, or as directed by your health care provider     © 4602-6157 [employer]. [address] [place] [address]. All rights reserved. This information is not intended as a substitute for professional medical care. Always follow your healthcare professional's instructions.         PRE-OP DIAGNOSIS:  Trigger thumb, right thumb 28-Nov-2022 16:52:03  Da Dougherty  Carpal tunnel syndrome on right 28-Nov-2022 16:52:14  Da Dougherty  Tenosynovitis of forearm 28-Nov-2022 16:52:44  Da Dougherty  Arthritis of carpometacarpal joint 28-Nov-2022 16:52:58  Da Dougherty

## 2022-11-28 NOTE — ASU DISCHARGE PLAN (ADULT/PEDIATRIC) - B. DRINK ALCOHOL, BEER, OR WINE
Initial Anesthesia Post-op Note    Patient: Percy Reyes  Procedure(s) Performed: REPAIR, HERNIA, INGUINAL, ROBOT-ASSISTED, LAPAROSCOPIC, USING DA CHUN XI/ LEFT - LEFT  Anesthesia type: General    Vitals Value Taken Time   Temp 36.2 °C (97.1 °F) 3/6/2020  1:47 PM   Pulse 81 3/6/2020  1:47 PM   Resp 18 3/6/2020  1:47 PM   SpO2 94 % 3/6/2020  1:47 PM   /96 3/6/2020  1:47 PM       Last 24 I/O:     Intake/Output Summary (Last 24 hours) at 3/6/2020 1351  Last data filed at 3/6/2020 1338  Gross per 24 hour   Intake 1000 ml   Output 260 ml   Net 740 ml         Patient Location: PACU Phase 1  Post-op Vital Signs:stable  Level of Consciousness: awake  Respiratory Status: spontaneous ventilation, unassisted and room air  Cardiovascular blood pressure returned to baseline and stable  Hydration: euvolemic  Pain Management: well controlled  Handoff: Handoff to receiving clinician was performed and questions were answered  Nausea: None  Airway Patency:patent  Post-op Assessment: no complications, patient tolerated procedure well with no complications and evidence of recall  Comments: Pt to PACU. VSS. Report to RN.      Statement Selected

## 2022-11-28 NOTE — BRIEF OPERATIVE NOTE - NSICDXBRIEFPROCEDURE_GEN_ALL_CORE_FT
PROCEDURES:  DeQuervains release 28-Nov-2022 16:51:08  Da Dougherty  Open release of right carpal tunnel 28-Nov-2022 16:51:22  Da Dougherty  Release, trigger thumb 28-Nov-2022 16:51:33  Da Dougherty  Arthroplasty, carpometacarpal 28-Nov-2022 16:51:43  Da Dougherty

## 2022-11-28 NOTE — ASU DISCHARGE PLAN (ADULT/PEDIATRIC) - NS MD DC FALL RISK RISK
For information on Fall & Injury Prevention, visit: [email or web address] OR  [email or web address] OR  https://www.cdc.gov/steadi/patient.html

## 2023-04-15 ENCOUNTER — INPATIENT (INPATIENT)
Facility: HOSPITAL | Age: 58
LOS: 2 days | Discharge: HOME | DRG: 392 | End: 2023-04-18
Attending: SURGERY | Admitting: SURGERY
Payer: COMMERCIAL

## 2023-04-15 VITALS
TEMPERATURE: 98 F | RESPIRATION RATE: 18 BRPM | WEIGHT: 149.03 LBS | HEART RATE: 75 BPM | DIASTOLIC BLOOD PRESSURE: 70 MMHG | SYSTOLIC BLOOD PRESSURE: 120 MMHG | OXYGEN SATURATION: 99 %

## 2023-04-15 DIAGNOSIS — Z98.890 OTHER SPECIFIED POSTPROCEDURAL STATES: Chronic | ICD-10-CM

## 2023-04-15 DIAGNOSIS — K57.20 DIVERTICULITIS OF LARGE INTESTINE WITH PERFORATION AND ABSCESS WITHOUT BLEEDING: ICD-10-CM

## 2023-04-15 DIAGNOSIS — K21.9 GASTRO-ESOPHAGEAL REFLUX DISEASE WITHOUT ESOPHAGITIS: ICD-10-CM

## 2023-04-15 DIAGNOSIS — R10.9 UNSPECIFIED ABDOMINAL PAIN: ICD-10-CM

## 2023-04-15 DIAGNOSIS — E03.9 HYPOTHYROIDISM, UNSPECIFIED: ICD-10-CM

## 2023-04-15 LAB
ALBUMIN SERPL ELPH-MCNC: 3.5 G/DL — SIGNIFICANT CHANGE UP (ref 3.3–5)
ALP SERPL-CCNC: 73 U/L — SIGNIFICANT CHANGE UP (ref 40–120)
ALT FLD-CCNC: 23 U/L — SIGNIFICANT CHANGE UP (ref 12–78)
ANION GAP SERPL CALC-SCNC: 5 MMOL/L — SIGNIFICANT CHANGE UP (ref 5–17)
APPEARANCE UR: CLEAR — SIGNIFICANT CHANGE UP
AST SERPL-CCNC: 20 U/L — SIGNIFICANT CHANGE UP (ref 15–37)
BACTERIA # UR AUTO: ABNORMAL
BASOPHILS # BLD AUTO: 0.04 K/UL — SIGNIFICANT CHANGE UP (ref 0–0.2)
BASOPHILS NFR BLD AUTO: 0.5 % — SIGNIFICANT CHANGE UP (ref 0–2)
BILIRUB SERPL-MCNC: 0.5 MG/DL — SIGNIFICANT CHANGE UP (ref 0.2–1.2)
BILIRUB UR-MCNC: NEGATIVE — SIGNIFICANT CHANGE UP
BUN SERPL-MCNC: 10 MG/DL — SIGNIFICANT CHANGE UP (ref 7–23)
CALCIUM SERPL-MCNC: 9 MG/DL — SIGNIFICANT CHANGE UP (ref 8.5–10.1)
CHLORIDE SERPL-SCNC: 113 MMOL/L — HIGH (ref 96–108)
CO2 SERPL-SCNC: 22 MMOL/L — SIGNIFICANT CHANGE UP (ref 22–31)
COLOR SPEC: SIGNIFICANT CHANGE UP
CREAT SERPL-MCNC: 0.76 MG/DL — SIGNIFICANT CHANGE UP (ref 0.5–1.3)
DIFF PNL FLD: ABNORMAL
EGFR: 91 ML/MIN/1.73M2 — SIGNIFICANT CHANGE UP
EOSINOPHIL # BLD AUTO: 0.1 K/UL — SIGNIFICANT CHANGE UP (ref 0–0.5)
EOSINOPHIL NFR BLD AUTO: 1.3 % — SIGNIFICANT CHANGE UP (ref 0–6)
EPI CELLS # UR: SIGNIFICANT CHANGE UP
FLUAV AG NPH QL: SIGNIFICANT CHANGE UP
FLUBV AG NPH QL: SIGNIFICANT CHANGE UP
GLUCOSE SERPL-MCNC: 81 MG/DL — SIGNIFICANT CHANGE UP (ref 70–99)
GLUCOSE UR QL: NEGATIVE — SIGNIFICANT CHANGE UP
HCT VFR BLD CALC: 36.9 % — SIGNIFICANT CHANGE UP (ref 34.5–45)
HGB BLD-MCNC: 12.9 G/DL — SIGNIFICANT CHANGE UP (ref 11.5–15.5)
IMM GRANULOCYTES NFR BLD AUTO: 0.1 % — SIGNIFICANT CHANGE UP (ref 0–0.9)
KETONES UR-MCNC: NEGATIVE — SIGNIFICANT CHANGE UP
LEUKOCYTE ESTERASE UR-ACNC: NEGATIVE — SIGNIFICANT CHANGE UP
LIDOCAIN IGE QN: 79 U/L — SIGNIFICANT CHANGE UP (ref 73–393)
LYMPHOCYTES # BLD AUTO: 1.45 K/UL — SIGNIFICANT CHANGE UP (ref 1–3.3)
LYMPHOCYTES # BLD AUTO: 19.1 % — SIGNIFICANT CHANGE UP (ref 13–44)
MCHC RBC-ENTMCNC: 33.7 PG — SIGNIFICANT CHANGE UP (ref 27–34)
MCHC RBC-ENTMCNC: 35 GM/DL — SIGNIFICANT CHANGE UP (ref 32–36)
MCV RBC AUTO: 96.3 FL — SIGNIFICANT CHANGE UP (ref 80–100)
MONOCYTES # BLD AUTO: 0.58 K/UL — SIGNIFICANT CHANGE UP (ref 0–0.9)
MONOCYTES NFR BLD AUTO: 7.6 % — SIGNIFICANT CHANGE UP (ref 2–14)
NEUTROPHILS # BLD AUTO: 5.41 K/UL — SIGNIFICANT CHANGE UP (ref 1.8–7.4)
NEUTROPHILS NFR BLD AUTO: 71.4 % — SIGNIFICANT CHANGE UP (ref 43–77)
NITRITE UR-MCNC: NEGATIVE — SIGNIFICANT CHANGE UP
NRBC # BLD: 0 /100 WBCS — SIGNIFICANT CHANGE UP (ref 0–0)
PH UR: 6.5 — SIGNIFICANT CHANGE UP (ref 5–8)
PLATELET # BLD AUTO: 235 K/UL — SIGNIFICANT CHANGE UP (ref 150–400)
POTASSIUM SERPL-MCNC: 3.4 MMOL/L — LOW (ref 3.5–5.3)
POTASSIUM SERPL-SCNC: 3.4 MMOL/L — LOW (ref 3.5–5.3)
PROT SERPL-MCNC: 7.5 G/DL — SIGNIFICANT CHANGE UP (ref 6–8.3)
PROT UR-MCNC: NEGATIVE — SIGNIFICANT CHANGE UP
RBC # BLD: 3.83 M/UL — SIGNIFICANT CHANGE UP (ref 3.8–5.2)
RBC # FLD: 12.8 % — SIGNIFICANT CHANGE UP (ref 10.3–14.5)
RBC CASTS # UR COMP ASSIST: SIGNIFICANT CHANGE UP /HPF (ref 0–4)
RSV RNA NPH QL NAA+NON-PROBE: SIGNIFICANT CHANGE UP
SARS-COV-2 RNA SPEC QL NAA+PROBE: SIGNIFICANT CHANGE UP
SODIUM SERPL-SCNC: 140 MMOL/L — SIGNIFICANT CHANGE UP (ref 135–145)
SP GR SPEC: 1 — LOW (ref 1.01–1.02)
UROBILINOGEN FLD QL: NEGATIVE — SIGNIFICANT CHANGE UP
WBC # BLD: 7.59 K/UL — SIGNIFICANT CHANGE UP (ref 3.8–10.5)
WBC # FLD AUTO: 7.59 K/UL — SIGNIFICANT CHANGE UP (ref 3.8–10.5)
WBC UR QL: SIGNIFICANT CHANGE UP

## 2023-04-15 PROCEDURE — 74177 CT ABD & PELVIS W/CONTRAST: CPT | Mod: 26,MA

## 2023-04-15 PROCEDURE — 99223 1ST HOSP IP/OBS HIGH 75: CPT

## 2023-04-15 PROCEDURE — 99285 EMERGENCY DEPT VISIT HI MDM: CPT

## 2023-04-15 RX ORDER — SODIUM CHLORIDE 9 MG/ML
1000 INJECTION INTRAMUSCULAR; INTRAVENOUS; SUBCUTANEOUS
Refills: 0 | Status: DISCONTINUED | OUTPATIENT
Start: 2023-04-15 | End: 2023-04-18

## 2023-04-15 RX ORDER — ONDANSETRON 8 MG/1
4 TABLET, FILM COATED ORAL EVERY 6 HOURS
Refills: 0 | Status: DISCONTINUED | OUTPATIENT
Start: 2023-04-15 | End: 2023-04-18

## 2023-04-15 RX ORDER — ACETAMINOPHEN 500 MG
1000 TABLET ORAL EVERY 6 HOURS
Refills: 0 | Status: DISCONTINUED | OUTPATIENT
Start: 2023-04-15 | End: 2023-04-16

## 2023-04-15 RX ORDER — SODIUM CHLORIDE 9 MG/ML
1000 INJECTION INTRAMUSCULAR; INTRAVENOUS; SUBCUTANEOUS ONCE
Refills: 0 | Status: COMPLETED | OUTPATIENT
Start: 2023-04-15 | End: 2023-04-15

## 2023-04-15 RX ORDER — MEROPENEM 1 G/30ML
1000 INJECTION INTRAVENOUS ONCE
Refills: 0 | Status: COMPLETED | OUTPATIENT
Start: 2023-04-15 | End: 2023-04-15

## 2023-04-15 RX ORDER — MORPHINE SULFATE 50 MG/1
4 CAPSULE, EXTENDED RELEASE ORAL ONCE
Refills: 0 | Status: DISCONTINUED | OUTPATIENT
Start: 2023-04-15 | End: 2023-04-15

## 2023-04-15 RX ORDER — ONDANSETRON 8 MG/1
4 TABLET, FILM COATED ORAL ONCE
Refills: 0 | Status: COMPLETED | OUTPATIENT
Start: 2023-04-15 | End: 2023-04-15

## 2023-04-15 RX ADMIN — MEROPENEM 1000 MILLIGRAM(S): 1 INJECTION INTRAVENOUS at 22:08

## 2023-04-15 RX ADMIN — MORPHINE SULFATE 4 MILLIGRAM(S): 50 CAPSULE, EXTENDED RELEASE ORAL at 18:33

## 2023-04-15 RX ADMIN — SODIUM CHLORIDE 1000 MILLILITER(S): 9 INJECTION INTRAMUSCULAR; INTRAVENOUS; SUBCUTANEOUS at 17:15

## 2023-04-15 RX ADMIN — MEROPENEM 100 MILLIGRAM(S): 1 INJECTION INTRAVENOUS at 21:38

## 2023-04-15 RX ADMIN — SODIUM CHLORIDE 100 MILLILITER(S): 9 INJECTION INTRAMUSCULAR; INTRAVENOUS; SUBCUTANEOUS at 23:30

## 2023-04-15 RX ADMIN — ONDANSETRON 4 MILLIGRAM(S): 8 TABLET, FILM COATED ORAL at 18:34

## 2023-04-15 NOTE — ED ADULT NURSE NOTE - OBJECTIVE STATEMENT
Received pt c/o LLQ abd pain since Tuesday radiating to L flank/side.   Pt denies n/v/d, reports last BM was Tuesday prior to pain onset and was "loose".  Denies fevers.  Denies dysuria/hematuria.   abd soft, tender worse to palpaption.  Pt states "I feel so bloated all over".   Pt was told by PMD and then again by C to come to ED to r/o diverticulitis (hx of diverticulosis).   Denies fevers. BN

## 2023-04-15 NOTE — H&P ADULT - HISTORY OF PRESENT ILLNESS
59 yo female w/ PMH hypothyroidism, hiatal hernia, GERD p/w LLQ abdominal pain x4 days. Pt reports having dinner Tuesday evening and started to experience diffuse abdominal pain shortly thereafter. Pain migrated to the LLQ, while radiating across her L flank to her left lower back. Pain has been consistent, without alleviating factors. Pain rated as a 7-8/10 in severity. Pt presented to an urgent care center earlier today and contacted her GI Primary, both of which whom recommended she present to the ED for evaluation. No BM or flatus today.  Denies subjective fever, N/V/D, BRBPR, melena, change in urinary habits, or LOC.

## 2023-04-15 NOTE — ED PROVIDER NOTE - OBJECTIVE STATEMENT
This patient is a 58-year-old female with a history of GERD who was feeling well until Tuesday night when she had dinner at work which was late and then shortly thereafter began having left lower abdominal pain which radiates slightly to the flank.  Patient states the pain is constant and is increased with coughing and movements the patient thought maybe it was gas and tried Gas-X type pills MiraLAX with positive bowel movement but no relief as well as stool softeners however the patient continues to remain symptomatic patient denies urinary symptoms and today she called her gastroenterologist who told her her colonoscopy had revealed diverticulosis in the past and he was concerned she may have diverticulitis the patient went to urgent care and was referred referred to the emergency room for evaluation.  Patient denies nausea or vomiting, fever or chills or recent URI symptoms patient is vaccinated for coronavirus

## 2023-04-15 NOTE — H&P ADULT - NSHPREVIEWOFSYSTEMS_GEN_ALL_CORE
Constitutional: Denies fever, fatigue or weight loss.  Skin: Denies rash.  Endocrine: Denies thyroid problems.  Cardiovascular: Denies chest pain or palpation.  Respiratory: Denies cough, shortness of breath, congestion, or wheezing.  Gastrointestinal: SEE HPI  Genitourinary: Denies dysuria.  Musculoskeletal: Denies joint swelling.  Neurologic: Denies headache.

## 2023-04-15 NOTE — H&P ADULT - PROBLEM SELECTOR PLAN 1
- Admit to surgery  - IV ABX  - Interval imaging pending clinical course  - Pain control, OOB, supportive care

## 2023-04-15 NOTE — ED PROVIDER NOTE - DIFFERENTIAL DIAGNOSIS
Abdominal pain rule out diverticulitis rule out constipation rule out renal colic rule out pyelonephritis Differential Diagnosis

## 2023-04-15 NOTE — ED ADULT NURSE NOTE - ED STAT RN HANDOFF DETAILS
1st attempt to give handoff report to RN Gen. RN needs call back in 10 mins. will continue to monitor.

## 2023-04-15 NOTE — ED PROVIDER NOTE - ATTESTATION, MLM
Health Care Proxy (HCP)
I have reviewed and confirmed nurses' notes for patient's medications, allergies, medical history, and surgical history.

## 2023-04-15 NOTE — ED PROVIDER NOTE - CLINICAL SUMMARY MEDICAL DECISION MAKING FREE TEXT BOX
This patient is a 58-year-old female with 4 days of left lower quadrant pain worse with movement.  Patient with no nausea vomiting or diarrhea.  Sent from urgent care to rule out diverticulitis we will check labs UA and CT of the abdomen to rule out diverticulitis.  Pain medicine as needed.  On exam patient with left lower abdominal tenderness for greater than right lower abdominal tenderness.  Will reevaluate after results are back.  IV fluid hydration

## 2023-04-15 NOTE — H&P ADULT - NSHPSOCIALHISTORY_GEN_ALL_CORE
Pt denies alcohol and elicit drug use. Past smoker, quit 20 years ago. Last colonoscopy in end of 2021, revealing diverticulosis.

## 2023-04-15 NOTE — ED PROVIDER NOTE - CARE PLAN
1 Principal Discharge DX:	Abdominal pain   Principal Discharge DX:	Abdominal pain  Secondary Diagnosis:	Acute diverticulitis of intestine

## 2023-04-15 NOTE — H&P ADULT - NSHPPHYSICALEXAM_GEN_ALL_CORE
General: No acute distress, appears comfortable, well nourished, well-groomed, appears stated age  Head, Eyes, Ears, Nose, Throat: Normal cephalic/atraumatic, anicteric, conjunctiva-non injected and moist, vision grossly intact, hearing grossly intact, no nasal discharge, ears and nose symmetrical and atraumatic.  Nasal, oral, and oropharyngeal mucosa pink moist with no evidence of ulceration  Neck: Supple, carotids have good upstroke, trachea in the midline, without JVD or thyromegaly  Lymphatic: No evidence of masses or lymphadenopathy in the head, neck, trunk, axillary, inguinal, or supraclavicular regions  Chest: Lungs are clear to P&A, no wheezing, no rales, no ronchi, with good inspiratory effort  Heart: Heart rhythm regular, no murmurs  Abdomen: Soft, non tender, good bowel sounds present in all four quadrants.  No guarding, rebound, and no peritoneal signs.  No evidence of hepatosplenomegaly.  No evidence of abdominal wall hernias.  Inguinal regions are unremarkable with no evidence of hernias.   Extremity: No swelling, or open sores, no gross deformities,  good range of motion, 2+ peripheral pulses bilat UE/LE, no edema,  negative Tasha's sign, no lymphadenopathy  Neuro: Alert and oriented x3, motor and sensory intact  Psychiatric: Awake , alert, oriented x3 with an appropriate affect.   Skin: Good color, turgor, texture with no gross lesions, no eruptions, no rashes, no subcutaneous nodules and normal temperature. General: No acute distress, appears comfortable, well nourished, well-groomed, appears stated age  Head, Eyes, Ears, Nose, Throat: Normal cephalic/atraumatic, anicteric  Chest: Lungs are clear to P&A, no wheezing, no rales, no ronchi, with good inspiratory effort  Heart: Heart rhythm regular, no murmurs  Abdomen: Soft, somewhat distended, moderate LLQ ttp; Rebound ttp noted to RLQ, however no guarding or layla peritonitic findings  Extremity: No swelling, or open sores, no gross deformities,  good range of motion  Neuro: Alert and oriented x3, motor and sensory intact  Psychiatric: Awake , alert, oriented x3 with an appropriate affect.   Skin: Good color, turgor, texture with no gross lesions, no eruptions, no rashes, no subcutaneous nodules and normal temperature.

## 2023-04-15 NOTE — H&P ADULT - ASSESSMENT
57 yo female w/ PMH hypothyroidism, hiatal hernia, GERD p/w LLQ abdominal pain x4 days, CT findings revealing acute sigmoid diverticulitis w/ 2.4 cm intramural abscess. VSS, labwork and exam reassuring.

## 2023-04-15 NOTE — H&P ADULT - NSICDXPASTMEDICALHX_GEN_ALL_CORE_FT
PAST MEDICAL HISTORY:  Asthma     Cervical Radiculopathy     GERD (Gastroesophageal Reflux Disease)     Hypothyroidism     Lumbar Radiculopathy

## 2023-04-15 NOTE — ED ADULT NURSE REASSESSMENT NOTE - NS ED NURSE REASSESS COMMENT FT1
No adverse reaction noted to Meropenem administration. Pt denies any rash, pain, sob, or discomfort at this time. will continue to monitor.
58 yr old female received from [facility]. A+O x 4. LLQ abdominal pain x 5 days. Denies fever. Awaiting surgical consult. Lab and CT results discussed with patient by Dr. Meyers. Pt agreeable to plan of care. Meropenum to be administered IV. will continue to monitor.

## 2023-04-16 PROCEDURE — 99232 SBSQ HOSP IP/OBS MODERATE 35: CPT

## 2023-04-16 PROCEDURE — 99222 1ST HOSP IP/OBS MODERATE 55: CPT

## 2023-04-16 RX ORDER — BEPOTASTINE BESILATE 1.5 %
1 DROPS OPHTHALMIC (EYE)
Qty: 0 | Refills: 0 | DISCHARGE

## 2023-04-16 RX ORDER — CROMOLYN SODIUM 4 %
1 DROPS OPHTHALMIC (EYE)
Qty: 0 | Refills: 0 | DISCHARGE

## 2023-04-16 RX ORDER — RIMEGEPANT SULFATE 75 MG/75MG
1 TABLET, ORALLY DISINTEGRATING ORAL
Qty: 0 | Refills: 0 | DISCHARGE

## 2023-04-16 RX ORDER — MINOXIDIL 10 MG
1.25 TABLET ORAL DAILY
Refills: 0 | Status: DISCONTINUED | OUTPATIENT
Start: 2023-04-16 | End: 2023-04-18

## 2023-04-16 RX ORDER — MONTELUKAST 4 MG/1
10 TABLET, CHEWABLE ORAL DAILY
Refills: 0 | Status: DISCONTINUED | OUTPATIENT
Start: 2023-04-16 | End: 2023-04-18

## 2023-04-16 RX ORDER — LORATADINE 10 MG/1
10 TABLET ORAL DAILY
Refills: 0 | Status: DISCONTINUED | OUTPATIENT
Start: 2023-04-16 | End: 2023-04-18

## 2023-04-16 RX ORDER — VALACYCLOVIR 500 MG/1
1 TABLET, FILM COATED ORAL
Qty: 0 | Refills: 0 | DISCHARGE

## 2023-04-16 RX ORDER — FEXOFENADINE HCL 30 MG
1 TABLET ORAL
Qty: 0 | Refills: 0 | DISCHARGE

## 2023-04-16 RX ORDER — ACETAMINOPHEN 500 MG
1000 TABLET ORAL ONCE
Refills: 0 | Status: COMPLETED | OUTPATIENT
Start: 2023-04-16 | End: 2023-04-16

## 2023-04-16 RX ORDER — OLOPATADINE HYDROCHLORIDE 1 MG/ML
1 SOLUTION/ DROPS OPHTHALMIC
Qty: 0 | Refills: 0 | DISCHARGE

## 2023-04-16 RX ORDER — ALBUTEROL 90 UG/1
2 AEROSOL, METERED ORAL
Qty: 0 | Refills: 0 | DISCHARGE

## 2023-04-16 RX ORDER — CALCIUM CARBONATE 500(1250)
1 TABLET ORAL
Qty: 0 | Refills: 0 | DISCHARGE

## 2023-04-16 RX ORDER — CHOLECALCIFEROL (VITAMIN D3) 10 MCG
1 TABLET ORAL
Qty: 0 | Refills: 0 | DISCHARGE

## 2023-04-16 RX ORDER — LEVOCETIRIZINE DIHYDROCHLORIDE 0.5 MG/ML
1 SOLUTION ORAL
Qty: 0 | Refills: 0 | DISCHARGE

## 2023-04-16 RX ORDER — LEVOTHYROXINE SODIUM 125 MCG
1 TABLET ORAL
Qty: 0 | Refills: 0 | DISCHARGE

## 2023-04-16 RX ORDER — OMEGA-3 ACID ETHYL ESTERS 1 G
3 CAPSULE ORAL
Qty: 0 | Refills: 0 | DISCHARGE

## 2023-04-16 RX ORDER — PANTOPRAZOLE SODIUM 20 MG/1
1 TABLET, DELAYED RELEASE ORAL
Qty: 0 | Refills: 0 | DISCHARGE

## 2023-04-16 RX ORDER — HYDROMORPHONE HYDROCHLORIDE 2 MG/ML
0.5 INJECTION INTRAMUSCULAR; INTRAVENOUS; SUBCUTANEOUS EVERY 6 HOURS
Refills: 0 | Status: DISCONTINUED | OUTPATIENT
Start: 2023-04-16 | End: 2023-04-18

## 2023-04-16 RX ORDER — MEROPENEM 1 G/30ML
1000 INJECTION INTRAVENOUS EVERY 8 HOURS
Refills: 0 | Status: DISCONTINUED | OUTPATIENT
Start: 2023-04-16 | End: 2023-04-16

## 2023-04-16 RX ORDER — CEFTRIAXONE 500 MG/1
1000 INJECTION, POWDER, FOR SOLUTION INTRAMUSCULAR; INTRAVENOUS EVERY 24 HOURS
Refills: 0 | Status: DISCONTINUED | OUTPATIENT
Start: 2023-04-16 | End: 2023-04-18

## 2023-04-16 RX ORDER — PROGESTERONE 200 MG/1
1 CAPSULE, LIQUID FILLED ORAL
Qty: 0 | Refills: 0 | DISCHARGE

## 2023-04-16 RX ORDER — FLUOROMETHOLONE 1 MG/ML
1 SOLUTION/ DROPS OPHTHALMIC
Qty: 0 | Refills: 0 | DISCHARGE

## 2023-04-16 RX ORDER — METRONIDAZOLE 500 MG
500 TABLET ORAL EVERY 8 HOURS
Refills: 0 | Status: DISCONTINUED | OUTPATIENT
Start: 2023-04-16 | End: 2023-04-18

## 2023-04-16 RX ORDER — ERGOCALCIFEROL 1.25 MG/1
1 CAPSULE ORAL
Qty: 0 | Refills: 0 | DISCHARGE

## 2023-04-16 RX ORDER — LEVOTHYROXINE SODIUM 125 MCG
50 TABLET ORAL DAILY
Refills: 0 | Status: DISCONTINUED | OUTPATIENT
Start: 2023-04-16 | End: 2023-04-18

## 2023-04-16 RX ORDER — VALACYCLOVIR 500 MG/1
500 TABLET, FILM COATED ORAL DAILY
Refills: 0 | Status: DISCONTINUED | OUTPATIENT
Start: 2023-04-16 | End: 2023-04-18

## 2023-04-16 RX ORDER — ACETAMINOPHEN 500 MG
1000 TABLET ORAL ONCE
Refills: 0 | Status: COMPLETED | OUTPATIENT
Start: 2023-04-17 | End: 2023-04-17

## 2023-04-16 RX ORDER — MONTELUKAST 4 MG/1
1 TABLET, CHEWABLE ORAL
Qty: 0 | Refills: 0 | DISCHARGE

## 2023-04-16 RX ORDER — ERGOCALCIFEROL 1.25 MG/1
50000 CAPSULE ORAL ONCE
Refills: 0 | Status: COMPLETED | OUTPATIENT
Start: 2023-04-16 | End: 2023-04-16

## 2023-04-16 RX ORDER — PROGESTERONE 200 MG/1
100 CAPSULE, LIQUID FILLED ORAL AT BEDTIME
Refills: 0 | Status: DISCONTINUED | OUTPATIENT
Start: 2023-04-16 | End: 2023-04-16

## 2023-04-16 RX ORDER — CYCLOBENZAPRINE HYDROCHLORIDE 10 MG/1
5 TABLET, FILM COATED ORAL ONCE
Refills: 0 | Status: COMPLETED | OUTPATIENT
Start: 2023-04-16 | End: 2023-04-16

## 2023-04-16 RX ORDER — PANTOPRAZOLE SODIUM 20 MG/1
40 TABLET, DELAYED RELEASE ORAL
Refills: 0 | Status: DISCONTINUED | OUTPATIENT
Start: 2023-04-16 | End: 2023-04-17

## 2023-04-16 RX ORDER — FREMANEZUMAB-VFRM 225 MG/1.5ML
4.5 INJECTION SUBCUTANEOUS
Qty: 0 | Refills: 0 | DISCHARGE

## 2023-04-16 RX ORDER — PHENTERMINE AND TOPIRAMATE 3.75; 23 MG/1; MG/1
1 CAPSULE, EXTENDED RELEASE ORAL
Qty: 0 | Refills: 0 | DISCHARGE

## 2023-04-16 RX ORDER — FLUTICASONE PROPIONATE 50 MCG
1 SPRAY, SUSPENSION NASAL
Qty: 0 | Refills: 0 | DISCHARGE

## 2023-04-16 RX ORDER — ALBUTEROL 90 UG/1
2 AEROSOL, METERED ORAL EVERY 6 HOURS
Refills: 0 | Status: DISCONTINUED | OUTPATIENT
Start: 2023-04-16 | End: 2023-04-18

## 2023-04-16 RX ORDER — DIAZEPAM 5 MG
2 TABLET ORAL ONCE
Refills: 0 | Status: DISCONTINUED | OUTPATIENT
Start: 2023-04-16 | End: 2023-04-16

## 2023-04-16 RX ADMIN — CYCLOBENZAPRINE HYDROCHLORIDE 5 MILLIGRAM(S): 10 TABLET, FILM COATED ORAL at 01:08

## 2023-04-16 RX ADMIN — MONTELUKAST 10 MILLIGRAM(S): 4 TABLET, CHEWABLE ORAL at 12:04

## 2023-04-16 RX ADMIN — ERGOCALCIFEROL 50000 UNIT(S): 1.25 CAPSULE ORAL at 14:10

## 2023-04-16 RX ADMIN — Medication 400 MILLIGRAM(S): at 12:06

## 2023-04-16 RX ADMIN — Medication 1.25 MILLIGRAM(S): at 14:10

## 2023-04-16 RX ADMIN — Medication 400 MILLIGRAM(S): at 23:39

## 2023-04-16 RX ADMIN — VALACYCLOVIR 500 MILLIGRAM(S): 500 TABLET, FILM COATED ORAL at 12:04

## 2023-04-16 RX ADMIN — PANTOPRAZOLE SODIUM 40 MILLIGRAM(S): 20 TABLET, DELAYED RELEASE ORAL at 05:11

## 2023-04-16 RX ADMIN — Medication 1000 MILLIGRAM(S): at 12:21

## 2023-04-16 RX ADMIN — Medication 1000 MILLIGRAM(S): at 23:50

## 2023-04-16 RX ADMIN — CEFTRIAXONE 100 MILLIGRAM(S): 500 INJECTION, POWDER, FOR SOLUTION INTRAMUSCULAR; INTRAVENOUS at 12:09

## 2023-04-16 RX ADMIN — LORATADINE 10 MILLIGRAM(S): 10 TABLET ORAL at 12:04

## 2023-04-16 RX ADMIN — Medication 50 MICROGRAM(S): at 05:10

## 2023-04-16 RX ADMIN — Medication 100 MILLIGRAM(S): at 14:12

## 2023-04-16 RX ADMIN — Medication 400 MILLIGRAM(S): at 18:36

## 2023-04-16 RX ADMIN — Medication 100 MILLIGRAM(S): at 22:08

## 2023-04-16 RX ADMIN — Medication 2 MILLIGRAM(S): at 23:39

## 2023-04-16 NOTE — PROGRESS NOTE ADULT - PROBLEM SELECTOR PLAN 1
- VSS  - AM labs pending  - Continue meropenem, consulted ID given patient's antibiotic allergies  - CLD  - Pain control, supportive care  - OOBA  - advised patient to bring in home estradiol and progesterone as pharmacy does not carry those medications    Discussed with Dr. Laureano

## 2023-04-16 NOTE — PATIENT PROFILE ADULT - VISION (WITH CORRECTIVE LENSES IF THE PATIENT USUALLY WEARS THEM):
wears glasses for reading/driving/Partially impaired: cannot see medication labels or newsprint, but can see obstacles in path, and the surrounding layout; can count fingers at arm's length

## 2023-04-16 NOTE — PROGRESS NOTE ADULT - SUBJECTIVE AND OBJECTIVE BOX
S: Patient seen and examined at bedside.  No acute overnight events.  Patient reports no new complaints at this time.  Admits to flatus and BM.  Voiding, ambulating and tolerating diet. Patient denies any fever, chills, chest pain, shortness of breath, nausea, vomiting, or urinary complaints.    MEDICATIONS:  acetaminophen   IVPB .. 1000 milliGRAM(s) IV Intermittent every 6 hours PRN  albuterol    90 MICROgram(s) HFA Inhaler 2 Puff(s) Inhalation every 6 hours PRN  levothyroxine 50 MICROGram(s) Oral daily  loratadine 10 milliGRAM(s) Oral daily  montelukast 10 milliGRAM(s) Oral daily  ondansetron Injectable 4 milliGRAM(s) IV Push every 6 hours PRN  pantoprazole    Tablet 40 milliGRAM(s) Oral before breakfast  sodium chloride 0.9%. 1000 milliLiter(s) IV Continuous <Continuous>  valACYclovir 500 milliGRAM(s) Oral daily      O:  Vital Signs Last 24 Hrs  T(C): 36.4 (16 Apr 2023 05:20), Max: 36.9 (15 Apr 2023 16:27)  T(F): 97.5 (16 Apr 2023 05:20), Max: 98.4 (15 Apr 2023 16:27)  HR: 60 (16 Apr 2023 05:20) (54 - 75)  BP: 100/62 (16 Apr 2023 05:20) (100/62 - 127/82)  BP(mean): --  RR: 18 (16 Apr 2023 05:20) (16 - 18)  SpO2: 96% (16 Apr 2023 05:20) (96% - 100%)    Parameters below as of 16 Apr 2023 05:20  Patient On (Oxygen Delivery Method): room air        I&O SUMMARY:    04-15-23 @ 07:01  -  04-16-23 @ 07:00  --------------------------------------------------------  IN:    sodium chloride 0.9%: 700 mL  Total IN: 700 mL    OUT:  Total OUT: 0 mL    Total NET: 700 mL    PHYSICAL EXAM:  GENERAL: No acute distress, lying in bed  HEAD:  Atraumatic, Normocephalic  CHEST/LUNG: Non-labored respirations, no accessory muscle use  HEART: Regular rate and rhythm  ABDOMEN: Soft, non-distended; hypoactive bowel sounds, ttp at the LLQ and infraumbilical region  EXT: calves non-tender b/l  NEUROLOGY: A&O x 3, no focal deficits    LABS:                        12.9   7.59  )-----------( 235      ( 15 Apr 2023 16:52 )             36.9     04-15    140  |  113<H>  |  10  ----------------------------<  81  3.4<L>   |  22  |  0.76    Ca    9.0      15 Apr 2023 16:52    TPro  7.5  /  Alb  3.5  /  TBili  0.5  /  DBili  x   /  AST  20  /  ALT  23  /  AlkPhos  73  04-15    RADIOLOGY:  < from: CT Abdomen and Pelvis w/ IV Cont (04.15.23 @ 18:12) >  ACC: [id number] EXAM:  CT ABDOMEN AND PELVIS IC   ORDERED BY: ARLEEN STOVER     PROCEDURE DATE:  04/15/2023          INTERPRETATION:  CLINICAL INFORMATION: Left lower quadrant pain, rule out   diverticulitis.    COMPARISON: None.    CONTRAST/COMPLICATIONS:  IV Contrast: Omnipaque 350  90 cc administered   10 cc discarded  Oral Contrast: NONE  Complications: None reported at time of study completion    PROCEDURE:  CT of the Abdomen and Pelvis was performed.  Sagittal and coronal reformats were performed.    FINDINGS:  LOWER CHEST: Partially imaged bilateral breast implants.    LIVER: Numerous cysts and additional hypodensities too small to   characterize. Hyperenhancing 6 mm right hepatic lobe lesion (2, 20),   indeterminate but could reflect flash hemangioma. 2.6 cm left hepatic   lobe lesion with peripheral nodular enhancement most likely reflecting   hemangioma (2, 24).  BILE DUCTS: Normal caliber.  GALLBLADDER: Within normal limits.  SPLEEN: Within normal limits.  PANCREAS: Within normal limits.  ADRENALS: Within normal limits.  KIDNEYS/URETERS: No hydronephrosis. Homogeneous symmetric renal   enhancement. Duplicated left renal collecting system.    BLADDER: Within normal limits.  REPRODUCTIVE ORGANS: Uterus and adnexa within normal limits.    BOWEL: No bowel obstruction. Appendix is normal. Colonic diverticulosis.   Focal wall thickening and inflammatory stranding involving the proximal   sigmoid colon compatible with acute diverticulitis. 2.4 x 1.1 x 0.8 cm   ovoid hypodensity in this region suspected to reflect early developing   intramural abscess (2, 80). No free air.  PERITONEUM: No ascites.  VESSELS: Within normal limits.  RETROPERITONEUM/LYMPH NODES: No lymphadenopathy.  ABDOMINAL WALL: Small fat-containing umbilical hernia.  BONES: Degenerative changes.    IMPRESSION:  Acute sigmoid diverticulitis with suspected small intramural abscess   measuring up to 2.4 cm. No free air.            --- End of Report ---            ROSARIO COPELAND MD; Attending Radiologist  This document has been electronically signed. Apr 15 2023  7:05PM    < end of copied text >

## 2023-04-16 NOTE — CONSULT NOTE ADULT - SUBJECTIVE AND OBJECTIVE BOX
[facility]  INFECTIOUS DISEASES   [address] [place] [address]  Tel: [phone]     Fax: [phone]  ========================================================  MD Claudia Anne Kaushal, MD Cho, Michelle, MD Sunjit, Jaspal, MD  ========================================================    MRN-[id number]  GRACIE JOHNSON     CC: Abdominal pain, Sigmoid diverticulitis w/ intramural abscess     HPI:  57 yo woman with PMH of Asthma, hypothyroidism, hiatal hernia, GERD was admitted with LLQ abdominal pain x4 days. Pt reports having dinner Tuesday evening and started to experience diffuse abdominal pain shortly thereafter. Pain migrated to the LLQ, while radiating across her L flank to her left lower back. Pain has been consistent, without alleviating factors. Pain rated as a 7-8/10 in severity. Pt presented to an urgent care center earlier today and contacted her GI Primary, both of which whom recommended she present to the ED for evaluation. No BM since admission.  Denies subjective fever, N/V/D, BRBPR, melena, change in urinary habits, or LOC. (15 Apr 2023 21:30)    PAST MEDICAL & SURGICAL HISTORY:  Asthma  GERD (Gastroesophageal Reflux Disease)  Lumbar Radiculopathy  Cervical Radiculopathy  Hypothyroidism  S/P Arthroscopic Knee Surgery  History of Right Breast Implant  H/O nasal septoplasty  H/O hand surgery    Social Hx: former smoker, no ETOH or drugs     FAMILY HISTORY:  No pertinent family history in first degree relatives    Allergies  tixocortol (Rash)  penicillin (Rash)  Celebrex (Rash)  corticosteroids (Rash)  iodopropynyl butylcarbamate (Rash)  adhesives (Rash)  hydrocodone (Unknown)  Triple Antibiotic First Aid (Rash)  bacitracin topical (Rash)  sulfa drugs (Rash)  Polysporin Ointment (Rash)    Intolerances  codeine (Vomiting)    Antibiotics:  MEDICATIONS  (STANDING):  acetaminophen   IVPB .. 1000 milliGRAM(s) IV Intermittent once  acetaminophen   IVPB .. 1000 milliGRAM(s) IV Intermittent once  acetaminophen   IVPB .. 1000 milliGRAM(s) IV Intermittent once  levothyroxine 50 MICROGram(s) Oral daily  loratadine 10 milliGRAM(s) Oral daily  meropenem  IVPB 1000 milliGRAM(s) IV Intermittent every 8 hours  montelukast 10 milliGRAM(s) Oral daily  pantoprazole    Tablet 40 milliGRAM(s) Oral before breakfast  sodium chloride 0.9%. 1000 milliLiter(s) (100 mL/Hr) IV Continuous <Continuous>  valACYclovir 500 milliGRAM(s) Oral daily    MEDICATIONS  (PRN):  albuterol    90 MICROgram(s) HFA Inhaler 2 Puff(s) Inhalation every 6 hours PRN Shortness of Breath  HYDROmorphone  Injectable 0.5 milliGRAM(s) IV Push every 6 hours PRN Severe Pain (7 - 10)  ondansetron Injectable 4 milliGRAM(s) IV Push every 6 hours PRN Nausea     REVIEW OF SYSTEMS:  CONSTITUTIONAL:  No Fever or chills  HEENT:  No diplopia or blurred vision.  No sore throat or runny nose.  CARDIOVASCULAR:  No chest pain or SOB.  RESPIRATORY:  No cough, shortness of breath, PND or orthopnea.  GASTROINTESTINAL:  No nausea, vomiting or diarrhea.   mild left lower abdominal pain  GENITOURINARY:  No dysuria, frequency or urgency. No Blood in urine  MUSCULOSKELETAL:  no joint aches, no muscle pain  SKIN:  No change in skin, hair or nails.  NEUROLOGIC:  No paresthesias or weakness.  PSYCHIATRIC:  No disorder of thought or mood.  ENDOCRINE:  No heat or cold intolerance, polyuria or polydipsia.  HEMATOLOGICAL:  No easy bruising or bleeding.     Physical Exam:  Vital Signs Last 24 Hrs  T(C): 36.4 (2023 05:20), Max: 36.9 (15 Apr 2023 16:27)  T(F): 97.5 (2023 05:20), Max: 98.4 (15 Apr 2023 16:27)  HR: 60 (2023 05:20) (54 - 75)  BP: 100/62 (2023 05:20) (100/62 - 127/82)  RR: 18 (2023 05:20) (16 - 18)  SpO2: 96% (2023 05:20) (96% - 100%)  Parameters below as of 2023 05:20  Patient On (Oxygen Delivery Method): room air  Weight (kg): 67.6 (04-15 @ 16:27)  GEN: NAD  HEENT: normocephalic and atraumatic. EOMI. PERRL.    NECK: Supple.  No lymphadenopathy   LUNGS: Clear to auscultation.  HEART: Regular rate and rhythm without murmur.  ABDOMEN: Soft, nondistended. Mild LLQ pain, Positive bowel sounds.    : No CVA tenderness  EXTREMITIES: Without edema.  NEUROLOGIC: grossly intact.  PSYCHIATRIC: Appropriate affect .  SKIN: No rash     Labs:  04-15    140  |  113<H>  |  10  ----------------------------<  81  3.4<L>   |  22  |  0.76    Ca    9.0      15 Apr 2023 16:52    TPro  7.5  /  Alb  3.5  /  TBili  0.5  /  DBili  x   /  AST  20  /  ALT  23  /  AlkPhos  73  04-15                        12.9   7.59  )-----------( 235      ( 15 Apr 2023 16:52 )             36.9     Urinalysis Basic - ( 15 Apr 2023 18:44 )    Color: Pale Yellow / Appearance: Clear / S.005 / pH: x  Gluc: x / Ketone: Negative  / Bili: Negative / Urobili: Negative   Blood: x / Protein: Negative / Nitrite: Negative   Leuk Esterase: Negative / RBC: 0-2 /HPF / WBC 0-2   Sq Epi: x / Non Sq Epi: x / Bacteria: Occasional    LIVER FUNCTIONS - ( 15 Apr 2023 16:52 )  Alb: 3.5 g/dL / Pro: 7.5 g/dL / ALK PHOS: 73 U/L / ALT: 23 U/L / AST: 20 U/L / GGT: x           SARS-CoV-2 Result: NotDetec (04-15-23 @ 16:52)    All imaging and other data have been reviewed.  < from: CT Abdomen and Pelvis w/ IV Cont (04.15.23 @ 18:12) >  IMPRESSION:  Acute sigmoid diverticulitis with suspected small intramural abscess   measuring up to 2.4 cm. No free air.    Assessment and Plan:   57 yo woman with PMH of Asthma, hypothyroidism, hiatal hernia, GERD was admitted with LLQ abdominal pain x4 days.  CT with Acute sigmoid diverticulitis with suspected small intramural abscess.  Allergic to PCN and sulfa, had Keflex in the past with no issue. To decrease possibility of resistance and also convenient po transition to same ABx regimen with switch to ceftriaxone and flagyl.    #Diverticulitis with Intramural abscess   - Will follow cultures   - Will monitor WBC and Tmax  - Stop Meropenem   - Start Ceftriaxone 1gm daily and Metronidazole 500mg q8  - When ready for discharge can switch to Cefpodoxime and metronidazole     Thank you for courtesy of this consult.     Will follow.  Discussed with the primary team.     Brigitte Sauceda MD  Division of Infectious Diseases   Please call ID service at [phone] with any question.    75 minutes spent on total encounter assessing patient, examination, chart review, counseling and coordinating care by the attending physician/nurse/care manager.

## 2023-04-16 NOTE — PATIENT PROFILE ADULT - ARRIVAL FROM
Per verbal order of Dr. [place]. Pt  needs to obtain a urine sample before antibiotic is given. Called Mariana Gaxiola [facility] and she stated that she gave shweta a cup for the urine specimen. I informed her that I would put an order in the computer for U/A with reflux when they dropped of the sample to the lab. She stated they would be using [facility] Home

## 2023-04-17 ENCOUNTER — TRANSCRIPTION ENCOUNTER (OUTPATIENT)
Age: 58
End: 2023-04-17

## 2023-04-17 LAB
ANION GAP SERPL CALC-SCNC: 3 MMOL/L — LOW (ref 5–17)
BASOPHILS # BLD AUTO: 0.04 K/UL — SIGNIFICANT CHANGE UP (ref 0–0.2)
BASOPHILS NFR BLD AUTO: 1 % — SIGNIFICANT CHANGE UP (ref 0–2)
BUN SERPL-MCNC: 6 MG/DL — LOW (ref 7–23)
CALCIUM SERPL-MCNC: 8.2 MG/DL — LOW (ref 8.5–10.1)
CHLORIDE SERPL-SCNC: 117 MMOL/L — HIGH (ref 96–108)
CO2 SERPL-SCNC: 24 MMOL/L — SIGNIFICANT CHANGE UP (ref 22–31)
CREAT SERPL-MCNC: 0.63 MG/DL — SIGNIFICANT CHANGE UP (ref 0.5–1.3)
EGFR: 103 ML/MIN/1.73M2 — SIGNIFICANT CHANGE UP
EOSINOPHIL # BLD AUTO: 0.15 K/UL — SIGNIFICANT CHANGE UP (ref 0–0.5)
EOSINOPHIL NFR BLD AUTO: 3.9 % — SIGNIFICANT CHANGE UP (ref 0–6)
GLUCOSE SERPL-MCNC: 88 MG/DL — SIGNIFICANT CHANGE UP (ref 70–99)
HCT VFR BLD CALC: 35.1 % — SIGNIFICANT CHANGE UP (ref 34.5–45)
HCV AB S/CO SERPL IA: 0.05 S/CO — SIGNIFICANT CHANGE UP (ref 0–0.99)
HCV AB SERPL-IMP: SIGNIFICANT CHANGE UP
HGB BLD-MCNC: 12.3 G/DL — SIGNIFICANT CHANGE UP (ref 11.5–15.5)
IMM GRANULOCYTES NFR BLD AUTO: 0.5 % — SIGNIFICANT CHANGE UP (ref 0–0.9)
LYMPHOCYTES # BLD AUTO: 1.47 K/UL — SIGNIFICANT CHANGE UP (ref 1–3.3)
LYMPHOCYTES # BLD AUTO: 38.4 % — SIGNIFICANT CHANGE UP (ref 13–44)
MCHC RBC-ENTMCNC: 33.3 PG — SIGNIFICANT CHANGE UP (ref 27–34)
MCHC RBC-ENTMCNC: 35 GM/DL — SIGNIFICANT CHANGE UP (ref 32–36)
MCV RBC AUTO: 95.1 FL — SIGNIFICANT CHANGE UP (ref 80–100)
MONOCYTES # BLD AUTO: 0.47 K/UL — SIGNIFICANT CHANGE UP (ref 0–0.9)
MONOCYTES NFR BLD AUTO: 12.3 % — SIGNIFICANT CHANGE UP (ref 2–14)
NEUTROPHILS # BLD AUTO: 1.68 K/UL — LOW (ref 1.8–7.4)
NEUTROPHILS NFR BLD AUTO: 43.9 % — SIGNIFICANT CHANGE UP (ref 43–77)
NRBC # BLD: 0 /100 WBCS — SIGNIFICANT CHANGE UP (ref 0–0)
PLATELET # BLD AUTO: 247 K/UL — SIGNIFICANT CHANGE UP (ref 150–400)
POTASSIUM SERPL-MCNC: 3.2 MMOL/L — LOW (ref 3.5–5.3)
POTASSIUM SERPL-SCNC: 3.2 MMOL/L — LOW (ref 3.5–5.3)
RBC # BLD: 3.69 M/UL — LOW (ref 3.8–5.2)
RBC # FLD: 12.4 % — SIGNIFICANT CHANGE UP (ref 10.3–14.5)
SODIUM SERPL-SCNC: 144 MMOL/L — SIGNIFICANT CHANGE UP (ref 135–145)
WBC # BLD: 3.83 K/UL — SIGNIFICANT CHANGE UP (ref 3.8–10.5)
WBC # FLD AUTO: 3.83 K/UL — SIGNIFICANT CHANGE UP (ref 3.8–10.5)

## 2023-04-17 PROCEDURE — 99231 SBSQ HOSP IP/OBS SF/LOW 25: CPT

## 2023-04-17 PROCEDURE — 99233 SBSQ HOSP IP/OBS HIGH 50: CPT

## 2023-04-17 RX ORDER — PANTOPRAZOLE SODIUM 20 MG/1
40 TABLET, DELAYED RELEASE ORAL DAILY
Refills: 0 | Status: DISCONTINUED | OUTPATIENT
Start: 2023-04-17 | End: 2023-04-17

## 2023-04-17 RX ORDER — POTASSIUM CHLORIDE 20 MEQ
40 PACKET (EA) ORAL EVERY 4 HOURS
Refills: 0 | Status: COMPLETED | OUTPATIENT
Start: 2023-04-17 | End: 2023-04-17

## 2023-04-17 RX ORDER — PANTOPRAZOLE SODIUM 20 MG/1
40 TABLET, DELAYED RELEASE ORAL
Refills: 0 | Status: DISCONTINUED | OUTPATIENT
Start: 2023-04-17 | End: 2023-04-17

## 2023-04-17 RX ORDER — PANTOPRAZOLE SODIUM 20 MG/1
40 TABLET, DELAYED RELEASE ORAL DAILY
Refills: 0 | Status: DISCONTINUED | OUTPATIENT
Start: 2023-04-18 | End: 2023-04-18

## 2023-04-17 RX ORDER — CALCIUM CARBONATE 500(1250)
1 TABLET ORAL ONCE
Refills: 0 | Status: COMPLETED | OUTPATIENT
Start: 2023-04-17 | End: 2023-04-17

## 2023-04-17 RX ORDER — POTASSIUM CHLORIDE 20 MEQ
10 PACKET (EA) ORAL
Refills: 0 | Status: DISCONTINUED | OUTPATIENT
Start: 2023-04-17 | End: 2023-04-17

## 2023-04-17 RX ORDER — SIMETHICONE 80 MG/1
80 TABLET, CHEWABLE ORAL ONCE
Refills: 0 | Status: COMPLETED | OUTPATIENT
Start: 2023-04-17 | End: 2023-04-17

## 2023-04-17 RX ADMIN — Medication 100 MILLIGRAM(S): at 21:46

## 2023-04-17 RX ADMIN — PANTOPRAZOLE SODIUM 40 MILLIGRAM(S): 20 TABLET, DELAYED RELEASE ORAL at 18:07

## 2023-04-17 RX ADMIN — Medication 1 TABLET(S): at 21:46

## 2023-04-17 RX ADMIN — LORATADINE 10 MILLIGRAM(S): 10 TABLET ORAL at 11:16

## 2023-04-17 RX ADMIN — Medication 1000 MILLIGRAM(S): at 05:45

## 2023-04-17 RX ADMIN — SODIUM CHLORIDE 100 MILLILITER(S): 9 INJECTION INTRAMUSCULAR; INTRAVENOUS; SUBCUTANEOUS at 21:30

## 2023-04-17 RX ADMIN — Medication 1.25 MILLIGRAM(S): at 05:21

## 2023-04-17 RX ADMIN — Medication 40 MILLIEQUIVALENT(S): at 13:45

## 2023-04-17 RX ADMIN — Medication 400 MILLIGRAM(S): at 05:20

## 2023-04-17 RX ADMIN — Medication 100 MILLIGRAM(S): at 13:45

## 2023-04-17 RX ADMIN — PANTOPRAZOLE SODIUM 40 MILLIGRAM(S): 20 TABLET, DELAYED RELEASE ORAL at 09:04

## 2023-04-17 RX ADMIN — SODIUM CHLORIDE 100 MILLILITER(S): 9 INJECTION INTRAMUSCULAR; INTRAVENOUS; SUBCUTANEOUS at 08:06

## 2023-04-17 RX ADMIN — Medication 100 MILLIGRAM(S): at 08:44

## 2023-04-17 RX ADMIN — CEFTRIAXONE 100 MILLIGRAM(S): 500 INJECTION, POWDER, FOR SOLUTION INTRAMUSCULAR; INTRAVENOUS at 11:17

## 2023-04-17 RX ADMIN — MONTELUKAST 10 MILLIGRAM(S): 4 TABLET, CHEWABLE ORAL at 11:16

## 2023-04-17 RX ADMIN — Medication 40 MILLIEQUIVALENT(S): at 18:07

## 2023-04-17 RX ADMIN — PANTOPRAZOLE SODIUM 40 MILLIGRAM(S): 20 TABLET, DELAYED RELEASE ORAL at 05:22

## 2023-04-17 RX ADMIN — Medication 50 MICROGRAM(S): at 05:21

## 2023-04-17 RX ADMIN — VALACYCLOVIR 500 MILLIGRAM(S): 500 TABLET, FILM COATED ORAL at 11:16

## 2023-04-17 RX ADMIN — SIMETHICONE 80 MILLIGRAM(S): 80 TABLET, CHEWABLE ORAL at 09:05

## 2023-04-17 NOTE — PROGRESS NOTE ADULT - SUBJECTIVE AND OBJECTIVE BOX
Hospital day: 2    58y Female admitted with Abdominal pain  Sigmoid diverticulitis with abscess.       Patient seen and examined bedside.  Pt noted to be ambulating in the hallway.  States when she woke up this AM, had noted pruritis to the back of her neck.  Currently without complaints.  Antibiotics held in lieu of examination.  Will attempt antibiotics again.  DIscussed with patient at bedside.  SHe is in agreement to restarting and if pruritis begins again, will hold.      T(F): 98 (04-17-23 @ 05:08), Max: 98.1 (04-16-23 @ 12:23)  HR: 60 (04-17-23 @ 05:08) (60 - 77)  BP: 117/79 (04-17-23 @ 05:08) (100/61 - 117/79)  RR: 20 (04-17-23 @ 05:08) (19 - 20)  SpO2: 98% (04-17-23 @ 05:08) (93% - 98%)  Wt(kg): --  CAPILLARY BLOOD GLUCOSE          PHYSICAL EXAM:  General: NAD, WDWN.   Neuro:  Alert & oriented x 3  HEENT: NCAT, EOMI, conjunctiva clear  CV: +S1+S2 regular rate and rhythm  Lung: clear to ausculation bilaterally  Abdomen: soft, NT, ND. BS+  Extremities: no pedal edema or calf tenderness noted   :     LABS:                        12.3   3.83  )-----------( 247      ( 17 Apr 2023 06:18 )             35.1     04-17    144  |  117<H>  |  6<L>  ----------------------------<  88  3.2<L>   |  24  |  0.63    Ca    8.2<L>      17 Apr 2023 06:18    TPro  7.5  /  Alb  3.5  /  TBili  0.5  /  DBili  x   /  AST  20  /  ALT  23  /  AlkPhos  73  04-15      I&O's Detail    16 Apr 2023 07:01  -  17 Apr 2023 07:00  --------------------------------------------------------  IN:    sodium chloride 0.9%: 1200 mL  Total IN: 1200 mL    OUT:  Total OUT: 0 mL    Total NET: 1200 mL            RADIOLOGY:    Impression:     Plan:  -Continue VTE prophylaxis-  -Continue antibiotics   -Increase activity with PT, OOB, Ambulate  -Patient instructed on and encouraged incentive spirometry use  -Continue local wound care  -f/u AM labs  -will discuss with        Surgical PA  Phone: [phone] or [phone] Hospital day: 2    58y Female admitted with Abdominal pain  Sigmoid diverticulitis with abscess.       Patient seen and examined bedside.  Pt noted to be ambulating in the hallway.  States when she woke up this AM, had noted pruritis to the back of her neck.  Currently without complaints.  Antibiotics held in lieu of examination.  Will attempt antibiotics again.  DIscussed with patient at bedside.  SHe is in agreement to restarting and if pruritis begins again, will hold.  Patient ambulating, voiding, passing flatus.      T(F): 98 (04-17-23 @ 05:08), Max: 98.1 (04-16-23 @ 12:23)  HR: 60 (04-17-23 @ 05:08) (60 - 77)  BP: 117/79 (04-17-23 @ 05:08) (100/61 - 117/79)  RR: 20 (04-17-23 @ 05:08) (19 - 20)  SpO2: 98% (04-17-23 @ 05:08) (93% - 98%)  Wt(kg): --  CAPILLARY BLOOD GLUCOSE          PHYSICAL EXAM:  General: NAD  Neuro:  Alert & oriented  Skin: Back of neck and chest without evidence of rash.  No excoriations or raised skin. NO evidence of pruritis or erythema at this time.   Abdomen: soft, ND.  BS+   Extremities: no pedal edema or calf tenderness noted       LABS:                        12.3   3.83  )-----------( 247      ( 17 Apr 2023 06:18 )             35.1     04-17    144  |  117<H>  |  6<L>  ----------------------------<  88  3.2<L>   |  24  |  0.63    Ca    8.2<L>      17 Apr 2023 06:18    TPro  7.5  /  Alb  3.5  /  TBili  0.5  /  DBili  x   /  AST  20  /  ALT  23  /  AlkPhos  73  04-15      I&O's Detail    16 Apr 2023 07:01  -  17 Apr 2023 07:00  --------------------------------------------------------  IN:    sodium chloride 0.9%: 1200 mL  Total IN: 1200 mL    OUT:  Total OUT: 0 mL    Total NET: 1200 mL            RADIOLOGY:

## 2023-04-17 NOTE — CARE COORDINATION ASSESSMENT. - NSCAREPROVIDERS_GEN_ALL_CORE_FT
CARE PROVIDERS:  Accepting Physician: Robert Laureano  Administration: Cristo Galloway  Administration: Yari Lopez  Admitting: Robert Laureano  Attending: Robert Laureano  Clinical Doc. Improvement: Kristyn Vickers  Consultant: Aspen Saucedo  Consultant: Brigitte Sauceda  Consultant: Heber Steiner ED Attending: Darrian Meyers ED Nurse: Rosie Dalal  HIM/Billing & Coding: Geraldine Umaña  Nurse: Misti Flores  Nurse: Abbi Hitchcock  Ordered: ADM, User  Override: Abbi Hitchcock  Override: Brayden Reyes  Override: Barb Grigsby  Override: Guevara Sher  PCA/Nursing Assistant: Theodora Garcia  PCA/Nursing Assistant: Smitha Harden  Primary Team: Lala Jacobs  Primary Team: Afsaneh Canales  Primary Team: Chandrika Koehler  Registered Dietitian: Yesica Duncan  Student: Tali Georges  UR// Supp. Assoc.: Racquel Conn  UR// Supp. Assoc.: Kenia Roy   CARE PROVIDERS:  Accepting Physician: Robert Laureano  Admitting: Robert Laureano  Attending: Robert Laureano  Consultant: Christian Cramer  Consultant: Brigitte Sauceda  Consultant: Heber Steiner ED Attending: Darrian Meyers ED Nurse: Rosie Dalal  HIM/Billing & Coding: Geraldine Umaña  Nurse: Misti Flores  Nurse: Abbi Hitchcock  Nurse: Irlanda Ricks  Ordered: ADM, User  Override: Nupur Delgadillo  Override: Abbi Hitchcock  Override: Guevara Sher  Override: Irlanda Ricks  PCA/Nursing Assistant: Nupur Delgadillo  Primary Team: Lala Jacobs  Primary Team: Chandrika Koehler  Primary Team: Afsaneh Canales  Registered Dietitian: Gabriella Arce  Student: Tali Georges  UR// Supp. Assoc.: Kenia Roy// Supp. Assoc.: Racquel Conn

## 2023-04-17 NOTE — PROGRESS NOTE ADULT - NS ATTEND AMEND GEN_ALL_CORE FT
Pt seen and examined this morning.  RLQ pain minimal.  Normal Labs.  CT imaging again personally reviewed and findings discussed with patient.  2 cm intramural sigmoid abscess.  Multiple hepatic cysts/hemangiomas (known as per patient).    Strong family history for diverticulosis/itis  Multiple antibiotic allergies.  Started on meropenem.  Will consult ID for additional recommendations.  Clear liquid diet for now.  Discharge as symptoms improve.   No acute surgical intervention planned for this first episode of diverticulitis unless fails medical therapy.
Case discussed with PA and seen by me this evening.  Feeling much better.  Has had several BM's  Tolerating clears.  Hypokalemic this morning.  K replacement given.  Repeat labs in am  Advance diet as tolerated.  Discharge planning for as early as tomorrow if tolerating diet.

## 2023-04-17 NOTE — PROGRESS NOTE ADULT - PROBLEM SELECTOR PLAN 1
Continue abx- flagyl, rocephin  Ambulation  Clear liquid diet  Benadryl for pruritis as needed  PPI daily  analgesia prn  Anti emetics prn  Continue synthroid  Will discuss with Dr. Laureano Continue abx- flagyl, rocephin  Ambulation  Clear liquid diet  Benadryl for pruritis as needed  PPI daily  analgesia prn  Anti emetics prn  Continue synthroid  Hypokalemia of 3.2 repleted with 3 potassium riders IV  Will discuss with Dr. Laureano

## 2023-04-17 NOTE — CARE COORDINATION ASSESSMENT. - NSPASTMEDSURGHISTORY_GEN_ALL_CORE_FT
PAST MEDICAL & SURGICAL HISTORY:  Cervical Radiculopathy      Lumbar Radiculopathy      GERD (Gastroesophageal Reflux Disease)      Asthma      History of Right Breast Implant      S/P Arthroscopic Knee Surgery      H/O hand surgery      H/O nasal septoplasty      Hypothyroidism

## 2023-04-17 NOTE — PHARMACOTHERAPY INTERVENTION NOTE - COMMENTS
Modified penicillin allergy history to state that patient tolerated ceftriaxone during this admission.    An Feng, PharmD, [facility]  Clinical Pharmacy Specialist, Infectious Diseases  Tele-Antimicrobial Stewardship Program (Tele-ASP)  Tele-ASP Phone: [phone]

## 2023-04-17 NOTE — PROGRESS NOTE ADULT - SUBJECTIVE AND OBJECTIVE BOX
[facility]  INFECTIOUS DISEASES   [address] [place] [address]  Tel: [phone]     Fax: [phone]  ========================================================  MD Claudia Anne Kaushal, MD Cho, Michelle, MD Sunjit, Jaspal, MD  ========================================================    N-[id number]  GRACIE JOHNSON     Follow up: Sigmoid diverticulitis w/ intramural abscess     Less pain in LLQ, had 2 BM, no diarrhea. No fever.  On liquid diet tolerating well. Had some pain in epigastric area that is better with PPI.     PAST MEDICAL & SURGICAL HISTORY:  Asthma  GERD (Gastroesophageal Reflux Disease)  Lumbar Radiculopathy  Cervical Radiculopathy  Hypothyroidism  S/P Arthroscopic Knee Surgery  History of Right Breast Implant  H/O nasal septoplasty  H/O hand surgery    Social Hx: former smoker, no ETOH or drugs     FAMILY HISTORY:  No pertinent family history in first degree relatives    Allergies  tixocortol (Rash)  penicillin (Rash)  Celebrex (Rash)  corticosteroids (Rash)  iodopropynyl butylcarbamate (Rash)  adhesives (Rash)  hydrocodone (Unknown)  Triple Antibiotic First Aid (Rash)  bacitracin topical (Rash)  sulfa drugs (Rash)  Polysporin Ointment (Rash)    Intolerances  codeine (Vomiting)    Antibiotics:  MEDICATIONS  (STANDING):  acetaminophen   IVPB .. 1000 milliGRAM(s) IV Intermittent once  acetaminophen   IVPB .. 1000 milliGRAM(s) IV Intermittent once  acetaminophen   IVPB .. 1000 milliGRAM(s) IV Intermittent once  levothyroxine 50 MICROGram(s) Oral daily  loratadine 10 milliGRAM(s) Oral daily  meropenem  IVPB 1000 milliGRAM(s) IV Intermittent every 8 hours  montelukast 10 milliGRAM(s) Oral daily  pantoprazole    Tablet 40 milliGRAM(s) Oral before breakfast  sodium chloride 0.9%. 1000 milliLiter(s) (100 mL/Hr) IV Continuous <Continuous>  valACYclovir 500 milliGRAM(s) Oral daily    MEDICATIONS  (PRN):  albuterol    90 MICROgram(s) HFA Inhaler 2 Puff(s) Inhalation every 6 hours PRN Shortness of Breath  HYDROmorphone  Injectable 0.5 milliGRAM(s) IV Push every 6 hours PRN Severe Pain (7 - 10)  ondansetron Injectable 4 milliGRAM(s) IV Push every 6 hours PRN Nausea     REVIEW OF SYSTEMS:  CONSTITUTIONAL:  No Fever or chills  HEENT:  No diplopia or blurred vision.  No sore throat or runny nose.  CARDIOVASCULAR:  No chest pain or SOB.  RESPIRATORY:  No cough, shortness of breath, PND or orthopnea.  GASTROINTESTINAL:  No nausea, vomiting or diarrhea.   mild left lower abdominal pain  GENITOURINARY:  No dysuria, frequency or urgency. No Blood in urine  MUSCULOSKELETAL:  no joint aches, no muscle pain  SKIN:  No change in skin, hair or nails.  NEUROLOGIC:  No paresthesias or weakness.  PSYCHIATRIC:  No disorder of thought or mood.  ENDOCRINE:  No heat or cold intolerance, polyuria or polydipsia.  HEMATOLOGICAL:  No easy bruising or bleeding.     Physical Exam:  Vital Signs Last 24 Hrs  T(C): 36.7 (17 Apr 2023 05:08), Max: 36.7 (16 Apr 2023 12:23)  T(F): 98 (17 Apr 2023 05:08), Max: 98.1 (16 Apr 2023 12:23)  HR: 60 (17 Apr 2023 05:08) (60 - 77)  BP: 117/79 (17 Apr 2023 05:08) (100/61 - 117/79)  BP(mean): --  RR: 20 (17 Apr 2023 05:08) (19 - 20)  SpO2: 98% (17 Apr 2023 05:08) (93% - 98%)  Parameters below as of 17 Apr 2023 05:08  Patient On (Oxygen Delivery Method): room air  GEN: NAD  HEENT: normocephalic and atraumatic. EOMI. PERRL.    NECK: Supple.  No lymphadenopathy   LUNGS: Clear to auscultation.  HEART: Regular rate and rhythm without murmur.  ABDOMEN: Soft, nondistended. Mild LLQ pain, Positive bowel sounds.    : No CVA tenderness  EXTREMITIES: Without edema.  NEUROLOGIC: grossly intact.  PSYCHIATRIC: Appropriate affect .  SKIN: No rash     Labs:                        12.3   3.83  )-----------( 247      ( 17 Apr 2023 06:18 )             35.1     04-17    144  |  117<H>  |  6<L>  ----------------------------<  88  3.2<L>   |  24  |  0.63    Ca    8.2<L>      17 Apr 2023 06:18    TPro  7.5  /  Alb  3.5  /  TBili  0.5  /  DBili  x   /  AST  20  /  ALT  23  /  AlkPhos  73  04-15    WBC Count: 3.83 K/uL (04-17-23 @ 06:18)  WBC Count: 7.59 K/uL (04-15-23 @ 16:52)    Creatinine, Serum: 0.63 mg/dL (04-17-23 @ 06:18)  Creatinine, Serum: 0.76 mg/dL (04-15-23 @ 16:52)     SARS-CoV-2 Result: NotDetec (04-15-23 @ 16:52)    All imaging and other data have been reviewed.  < from: CT Abdomen and Pelvis w/ IV Cont (04.15.23 @ 18:12) >  IMPRESSION:  Acute sigmoid diverticulitis with suspected small intramural abscess   measuring up to 2.4 cm. No free air.    Assessment and Plan:   59 yo woman with PMH of Asthma, hypothyroidism, hiatal hernia, GERD was admitted with LLQ abdominal pain x4 days.  CT with Acute sigmoid diverticulitis with suspected small intramural abscess.  Allergic to PCN and sulfa, had Keflex in the past with no issue. To decrease possibility of resistance and also convenient po transition to same ABx regimen with switch to ceftriaxone and flagyl.    #Diverticulitis with Intramural abscess   - Will monitor WBC and Tmax both normal today  - Continue Ceftriaxone 1gm daily and Metronidazole 500mg q8 no allergic reaction   - Advance diet   - When on regular diet can switch to Cefpodoxime and metronidazole po for total 2 weeks from start     Will follow.    Brigitte Sauceda MD  Division of Infectious Diseases   Please call ID service at [phone] with any question.    35 minutes spent on total encounter assessing patient, examination, chart review, counseling and coordinating care by the attending physician/nurse/care manager.

## 2023-04-18 ENCOUNTER — TRANSCRIPTION ENCOUNTER (OUTPATIENT)
Age: 58
End: 2023-04-18

## 2023-04-18 VITALS
TEMPERATURE: 98 F | SYSTOLIC BLOOD PRESSURE: 125 MMHG | RESPIRATION RATE: 18 BRPM | DIASTOLIC BLOOD PRESSURE: 83 MMHG | HEART RATE: 83 BPM

## 2023-04-18 LAB
ANION GAP SERPL CALC-SCNC: 5 MMOL/L — SIGNIFICANT CHANGE UP (ref 5–17)
BUN SERPL-MCNC: 5 MG/DL — LOW (ref 7–23)
CALCIUM SERPL-MCNC: 9 MG/DL — SIGNIFICANT CHANGE UP (ref 8.5–10.1)
CHLORIDE SERPL-SCNC: 111 MMOL/L — HIGH (ref 96–108)
CO2 SERPL-SCNC: 25 MMOL/L — SIGNIFICANT CHANGE UP (ref 22–31)
CREAT SERPL-MCNC: 0.85 MG/DL — SIGNIFICANT CHANGE UP (ref 0.5–1.3)
EGFR: 79 ML/MIN/1.73M2 — SIGNIFICANT CHANGE UP
GLUCOSE SERPL-MCNC: 172 MG/DL — HIGH (ref 70–99)
HCT VFR BLD CALC: 39.7 % — SIGNIFICANT CHANGE UP (ref 34.5–45)
HGB BLD-MCNC: 13.8 G/DL — SIGNIFICANT CHANGE UP (ref 11.5–15.5)
MAGNESIUM SERPL-MCNC: 2 MG/DL — SIGNIFICANT CHANGE UP (ref 1.6–2.6)
MCHC RBC-ENTMCNC: 33.2 PG — SIGNIFICANT CHANGE UP (ref 27–34)
MCHC RBC-ENTMCNC: 34.8 GM/DL — SIGNIFICANT CHANGE UP (ref 32–36)
MCV RBC AUTO: 95.4 FL — SIGNIFICANT CHANGE UP (ref 80–100)
NRBC # BLD: 0 /100 WBCS — SIGNIFICANT CHANGE UP (ref 0–0)
PHOSPHATE SERPL-MCNC: 2.3 MG/DL — LOW (ref 2.5–4.5)
PLATELET # BLD AUTO: 308 K/UL — SIGNIFICANT CHANGE UP (ref 150–400)
POTASSIUM SERPL-MCNC: 3.4 MMOL/L — LOW (ref 3.5–5.3)
POTASSIUM SERPL-SCNC: 3.4 MMOL/L — LOW (ref 3.5–5.3)
RBC # BLD: 4.16 M/UL — SIGNIFICANT CHANGE UP (ref 3.8–5.2)
RBC # FLD: 12.7 % — SIGNIFICANT CHANGE UP (ref 10.3–14.5)
SODIUM SERPL-SCNC: 141 MMOL/L — SIGNIFICANT CHANGE UP (ref 135–145)
WBC # BLD: 5.18 K/UL — SIGNIFICANT CHANGE UP (ref 3.8–10.5)
WBC # FLD AUTO: 5.18 K/UL — SIGNIFICANT CHANGE UP (ref 3.8–10.5)

## 2023-04-18 PROCEDURE — 84100 ASSAY OF PHOSPHORUS: CPT

## 2023-04-18 PROCEDURE — 80053 COMPREHEN METABOLIC PANEL: CPT

## 2023-04-18 PROCEDURE — 99232 SBSQ HOSP IP/OBS MODERATE 35: CPT

## 2023-04-18 PROCEDURE — 74177 CT ABD & PELVIS W/CONTRAST: CPT | Mod: MA

## 2023-04-18 PROCEDURE — 81001 URINALYSIS AUTO W/SCOPE: CPT

## 2023-04-18 PROCEDURE — 96374 THER/PROPH/DIAG INJ IV PUSH: CPT

## 2023-04-18 PROCEDURE — 83690 ASSAY OF LIPASE: CPT

## 2023-04-18 PROCEDURE — 96375 TX/PRO/DX INJ NEW DRUG ADDON: CPT

## 2023-04-18 PROCEDURE — 87637 SARSCOV2&INF A&B&RSV AMP PRB: CPT

## 2023-04-18 PROCEDURE — 99238 HOSP IP/OBS DSCHRG MGMT 30/<: CPT

## 2023-04-18 PROCEDURE — 86803 HEPATITIS C AB TEST: CPT

## 2023-04-18 PROCEDURE — 85025 COMPLETE CBC W/AUTO DIFF WBC: CPT

## 2023-04-18 PROCEDURE — 80048 BASIC METABOLIC PNL TOTAL CA: CPT

## 2023-04-18 PROCEDURE — 83735 ASSAY OF MAGNESIUM: CPT

## 2023-04-18 PROCEDURE — 85027 COMPLETE CBC AUTOMATED: CPT

## 2023-04-18 PROCEDURE — 99285 EMERGENCY DEPT VISIT HI MDM: CPT

## 2023-04-18 PROCEDURE — 36415 COLL VENOUS BLD VENIPUNCTURE: CPT

## 2023-04-18 RX ORDER — METRONIDAZOLE 500 MG
1 TABLET ORAL
Qty: 36 | Refills: 0
Start: 2023-04-18 | End: 2023-04-29

## 2023-04-18 RX ORDER — SODIUM,POTASSIUM PHOSPHATES 278-250MG
1 POWDER IN PACKET (EA) ORAL ONCE
Refills: 0 | Status: COMPLETED | OUTPATIENT
Start: 2023-04-18 | End: 2023-04-18

## 2023-04-18 RX ORDER — MINOXIDIL 10 MG
0.5 TABLET ORAL
Refills: 0 | DISCHARGE

## 2023-04-18 RX ORDER — CEFPODOXIME PROXETIL 100 MG
1 TABLET ORAL
Qty: 24 | Refills: 0
Start: 2023-04-18 | End: 2023-04-29

## 2023-04-18 RX ORDER — LORATADINE 10 MG/1
1 TABLET ORAL
Qty: 0 | Refills: 0 | DISCHARGE
Start: 2023-04-18

## 2023-04-18 RX ORDER — POTASSIUM PHOSPHATE, MONOBASIC POTASSIUM PHOSPHATE, DIBASIC 236; 224 MG/ML; MG/ML
30 INJECTION, SOLUTION INTRAVENOUS ONCE
Refills: 0 | Status: DISCONTINUED | OUTPATIENT
Start: 2023-04-18 | End: 2023-04-18

## 2023-04-18 RX ADMIN — CEFTRIAXONE 100 MILLIGRAM(S): 500 INJECTION, POWDER, FOR SOLUTION INTRAMUSCULAR; INTRAVENOUS at 11:18

## 2023-04-18 RX ADMIN — POTASSIUM PHOSPHATE, MONOBASIC POTASSIUM PHOSPHATE, DIBASIC 83.33 MILLIMOLE(S): 236; 224 INJECTION, SOLUTION INTRAVENOUS at 11:18

## 2023-04-18 RX ADMIN — VALACYCLOVIR 500 MILLIGRAM(S): 500 TABLET, FILM COATED ORAL at 11:17

## 2023-04-18 RX ADMIN — Medication 1 PACKET(S): at 13:09

## 2023-04-18 RX ADMIN — LORATADINE 10 MILLIGRAM(S): 10 TABLET ORAL at 11:17

## 2023-04-18 RX ADMIN — PANTOPRAZOLE SODIUM 40 MILLIGRAM(S): 20 TABLET, DELAYED RELEASE ORAL at 07:31

## 2023-04-18 RX ADMIN — SODIUM CHLORIDE 100 MILLILITER(S): 9 INJECTION INTRAMUSCULAR; INTRAVENOUS; SUBCUTANEOUS at 08:48

## 2023-04-18 RX ADMIN — MONTELUKAST 10 MILLIGRAM(S): 4 TABLET, CHEWABLE ORAL at 11:17

## 2023-04-18 NOTE — DISCHARGE NOTE PROVIDER - HOSPITAL COURSE
HPI: 57 yo female w/ PMH hypothyroidism, hiatal hernia, GERD p/w LLQ abdominal pain x4 days. Pt reports having dinner Tuesday evening and started to experience diffuse abdominal pain shortly thereafter. Pain migrated to the LLQ, while radiating across her L flank to her left lower back. Pain has been consistent, without alleviating factors. Pain rated as a 7-8/10 in severity. Pt presented to an urgent care center earlier today and contacted her GI Primary, both of which whom recommended she present to the ED for evaluation. No BM or flatus today.  Denies subjective fever, N/V/D, BRBPR, melena, change in urinary habits, or LOC.    CTAP w/ IV contrast reveals sigmoid diverticulitis w/ 2.4 cm intramural abscess. Pt was admitted to surgery and started on IV antibiotics.    Throughout hospital stay, patient was continued on antibiotics, given pain control as appropriate. Diet was advanced as tolerated by the patient. Lab values were monitored with repletion of electrolytes as necessary.     Deemed surgically stable for discharge on _______   HPI: 57 yo female w/ PMH hypothyroidism, hiatal hernia, GERD p/w LLQ abdominal pain x4 days. Pt reports having dinner Tuesday evening and started to experience diffuse abdominal pain shortly thereafter. Pain migrated to the LLQ, while radiating across her L flank to her left lower back. Pain has been consistent, without alleviating factors. Pain rated as a 7-8/10 in severity. Pt presented to an urgent care center earlier today and contacted her GI Primary, both of which whom recommended she present to the ED for evaluation. No BM or flatus today.  Denies subjective fever, N/V/D, BRBPR, melena, change in urinary habits, or LOC.    CTAP w/ IV contrast reveals sigmoid diverticulitis w/ 2.4 cm intramural abscess. Pt was admitted to surgery and started on IV antibiotics, managed conservatively.    Throughout hospital stay, patient was continued on antibiotics, given pain control as appropriate. Diet was advanced as tolerated by the patient. Lab values were monitored with repletion of electrolytes as necessary.     Deemed surgically stable for discharge on HOD#3 w/ oral antibiotics per ID recs.

## 2023-04-18 NOTE — PROGRESS NOTE ADULT - REASON FOR ADMISSION
Sigmoid diverticulitis w/ intramural abscess

## 2023-04-18 NOTE — DISCHARGE NOTE PROVIDER - CARE PROVIDER_API CALL
Robert Laureano)  Surgery  [address] [place] [address]  Phone: [phone]  Fax: [phone]  Follow Up Time:

## 2023-04-18 NOTE — PROGRESS NOTE ADULT - ASSESSMENT
59 yo female w/ PMH hypothyroidism, hiatal hernia, GERD admitted with acute sigmoid diverticulitis with 2.4 cm intramural abscess.   
58 yo female here for diverticulitis with pmhx of hypothyroidism, GERD, hiatal hernia.  Pt improving- normalized white count
58 y/o female w/ PMHx of hypothyroidism, GERD, hiatal hernia presenting w/ diverticulitis w/ small intramural abscess. Pt improving.

## 2023-04-18 NOTE — PROGRESS NOTE ADULT - PROBLEM SELECTOR PLAN 1
- Continue abx, ID following  - Diet advanced, monitor for tolerance  - Encourage ambulation  - Trending labs - replete lytes  - Discharge planning - Continue abx, ID following  - Diet advanced, monitor for tolerance  - Encourage ambulation  - Trending labs - replete lytes  - Discharge planning - will d/c on oral abx

## 2023-04-18 NOTE — PROGRESS NOTE ADULT - SUBJECTIVE AND OBJECTIVE BOX
Pt seen and examined at bedside, denies any overnight events. Vital signs stable. Reports mild acid reflux & very minimal pain, significantly improved since HOD#1. Tolerating clear liquid diet, eager to eat more solid foods. Passing flatus, (+) loose BM yesterday. Pt OOB & ambulating in hallway.  Denies any nausea, vomiting, abd bloating, chest pain, palpitations, shortness of breath.    T(C): 36.8 (04-18-23 @ 04:31), Max: 36.8 (04-17-23 @ 19:41)  HR: 64 (04-18-23 @ 04:31) (60 - 74)  BP: 120/73 (04-18-23 @ 04:31) (111/80 - 128/74)  RR: 17 (04-18-23 @ 04:31) (17 - 20)  SpO2: 94% (04-18-23 @ 04:31) (94% - 99%)  Wt(kg): --    PHYSICAL EXAM:  General: no acute distress, appears comfortable  HEENT: normocephalic, anicteric  Pulm: non labored respirations  Cardio: RRR  Abdomen: soft, mildly tender in lower abd, nondistended  Extremities: no calf tenderness noted    I&O's Detail    17 Apr 2023 07:01  -  18 Apr 2023 07:00  --------------------------------------------------------  IN:    IV PiggyBack: 400 mL    Oral Fluid: 900 mL    sodium chloride 0.9%: 1100 mL  Total IN: 2400 mL    OUT:  Total OUT: 0 mL    Total NET: 2400 mL          LABS:                        12.3   3.83  )-----------( 247      ( 17 Apr 2023 06:18 )             35.1     04-17    144  |  117<H>  |  6<L>  ----------------------------<  88  3.2<L>   |  24  |  0.63    Ca    8.2<L>      17 Apr 2023 06:18    MEDICATIONS:  albuterol    90 MICROgram(s) HFA Inhaler 2 Puff(s) Inhalation every 6 hours PRN  cefTRIAXone   IVPB 1000 milliGRAM(s) IV Intermittent every 24 hours  HYDROmorphone  Injectable 0.5 milliGRAM(s) IV Push every 6 hours PRN  levothyroxine 50 MICROGram(s) Oral daily  loratadine 10 milliGRAM(s) Oral daily  metroNIDAZOLE  IVPB 500 milliGRAM(s) IV Intermittent every 8 hours  minoxidil 1.25 milliGRAM(s) Oral daily  montelukast 10 milliGRAM(s) Oral daily  ondansetron Injectable 4 milliGRAM(s) IV Push every 6 hours PRN  pantoprazole    Tablet 40 milliGRAM(s) Oral daily  sodium chloride 0.9%. 1000 milliLiter(s) IV Continuous <Continuous>  valACYclovir 500 milliGRAM(s) Oral daily

## 2023-04-18 NOTE — DISCHARGE NOTE PROVIDER - NSDCCPCAREPLAN_GEN_ALL_CORE_FT
PRINCIPAL DISCHARGE DIAGNOSIS  Diagnosis: Abscess of sigmoid colon due to diverticulitis  Assessment and Plan of Treatment:       SECONDARY DISCHARGE DIAGNOSES  Diagnosis: Acute diverticulitis of intestine  Assessment and Plan of Treatment:

## 2023-04-18 NOTE — PROGRESS NOTE ADULT - PROBLEM SELECTOR PROBLEM 1
Abscess of sigmoid colon due to diverticulitis

## 2023-04-18 NOTE — DISCHARGE NOTE NURSING/CASE MANAGEMENT/SOCIAL WORK - PATIENT PORTAL LINK FT
You can access the FollowMyHealth Patient Portal offered by [facility] by registering at the following website: [email or web address]. By joining [employer]’s FollowMyHealth portal, you will also be able to view your health information using other applications (apps) compatible with our system.

## 2023-04-18 NOTE — PROGRESS NOTE ADULT - SUBJECTIVE AND OBJECTIVE BOX
[facility]  INFECTIOUS DISEASES   [address] [place] [address]  Tel: [phone]     Fax: [phone]  ========================================================  MD Claudia Anne Kaushal, MD Cho, Michelle, MD Sunjit, Jaspal, MD  ========================================================    [id number]  GRACIE JOHNSON     Follow up: Sigmoid diverticulitis w/ intramural abscess     Less pain in LLQ, had BM, No fever.  On low fiber diet tolerating well.     PAST MEDICAL & SURGICAL HISTORY:  Asthma  GERD (Gastroesophageal Reflux Disease)  Lumbar Radiculopathy  Cervical Radiculopathy  Hypothyroidism  S/P Arthroscopic Knee Surgery  History of Right Breast Implant  H/O nasal septoplasty  H/O hand surgery    Social Hx: former smoker, no ETOH or drugs     FAMILY HISTORY:  No pertinent family history in first degree relatives    Allergies  tixocortol (Rash)  penicillin (Rash)  Celebrex (Rash)  corticosteroids (Rash)  iodopropynyl butylcarbamate (Rash)  adhesives (Rash)  hydrocodone (Unknown)  Triple Antibiotic First Aid (Rash)  bacitracin topical (Rash)  sulfa drugs (Rash)  Polysporin Ointment (Rash)    Intolerances  codeine (Vomiting)    Antibiotics  Ceftriaxone and flagyl      REVIEW OF SYSTEMS:  CONSTITUTIONAL:  No Fever or chills  HEENT:  No diplopia or blurred vision.  No sore throat or runny nose.  CARDIOVASCULAR:  No chest pain or SOB.  RESPIRATORY:  No cough, shortness of breath, PND or orthopnea.  GASTROINTESTINAL:  No nausea, vomiting or diarrhea.   mild left lower abdominal pain  GENITOURINARY:  No dysuria, frequency or urgency. No Blood in urine  MUSCULOSKELETAL:  no joint aches, no muscle pain  SKIN:  No change in skin, hair or nails.  NEUROLOGIC:  No paresthesias or weakness.  PSYCHIATRIC:  No disorder of thought or mood.  ENDOCRINE:  No heat or cold intolerance, polyuria or polydipsia.  HEMATOLOGICAL:  No easy bruising or bleeding.     Physical Exam:  Vital Signs Last 24 Hrs  T(C): 36.6 (18 Apr 2023 13:31), Max: 36.8 (17 Apr 2023 19:41)  T(F): 97.8 (18 Apr 2023 13:31), Max: 98.3 (18 Apr 2023 00:31)  HR: 83 (18 Apr 2023 13:31) (60 - 83)  BP: 125/83 (18 Apr 2023 13:31) (120/73 - 128/74)  RR: 18 (18 Apr 2023 13:31) (17 - 18)  SpO2: 94% (18 Apr 2023 04:31) (94% - 99%)  Parameters below as of 18 Apr 2023 04:31  Patient On (Oxygen Delivery Method): room air  GEN: NAD  HEENT: normocephalic and atraumatic. EOMI. PERRL.    NECK: Supple.  No lymphadenopathy   LUNGS: Clear to auscultation.  HEART: Regular rate and rhythm without murmur.  ABDOMEN: Soft, nondistended. Mild LLQ pain, Positive bowel sounds.    : No CVA tenderness  EXTREMITIES: Without edema.  NEUROLOGIC: grossly intact.  PSYCHIATRIC: Appropriate affect .  SKIN: No rash     Labs:                        13.8   5.18  )-----------( 308      ( 18 Apr 2023 08:50 )             39.7     04-18    141  |  111<H>  |  5<L>  ----------------------------<  172<H>  3.4<L>   |  25  |  0.85    Ca    9.0      18 Apr 2023 08:50  Phos  2.3     04-18  Mg     2.0     04-18    WBC Count: 5.18 K/uL (04-18-23 @ 08:50)  WBC Count: 3.83 K/uL (04-17-23 @ 06:18)  WBC Count: 7.59 K/uL (04-15-23 @ 16:52)    Creatinine, Serum: 0.85 mg/dL (04-18-23 @ 08:50)  Creatinine, Serum: 0.63 mg/dL (04-17-23 @ 06:18)  Creatinine, Serum: 0.76 mg/dL (04-15-23 @ 16:52)    SARS-CoV-2 Result: NotDetec (04-15-23 @ 16:52)    All imaging and other data have been reviewed.  < from: CT Abdomen and Pelvis w/ IV Cont (04.15.23 @ 18:12) >  IMPRESSION:  Acute sigmoid diverticulitis with suspected small intramural abscess   measuring up to 2.4 cm. No free air.    Assessment and Plan:   57 yo woman with PMH of Asthma, hypothyroidism, hiatal hernia, GERD was admitted with LLQ abdominal pain x4 days.  CT with Acute sigmoid diverticulitis with suspected small intramural abscess.  Allergic to PCN and sulfa, had Keflex in the past with no issue. To decrease possibility of resistance and also convenient po transition to same ABx regimen with switch to ceftriaxone and flagyl.    #Diverticulitis with Intramural abscess   - Will monitor WBC and Tmax both normal today  - Can switch to oral  Cefpodoxime and metronidazole po for total 2 weeks from start   - Follow up with surgery after discharge     Will sign off please call with any question.     Brigitte Sauceda MD  Division of Infectious Diseases   Please call ID service at [phone] with any question.    35 minutes spent on total encounter assessing patient, examination, chart review, counseling and coordinating care by the attending physician/nurse/care manager.

## 2023-04-18 NOTE — DISCHARGE NOTE PROVIDER - NSDCMRMEDTOKEN_GEN_ALL_CORE_FT
Ajovy 225 mg/1.5 mL subcutaneous solution: 4.5 milliliter(s) subcutaneous every 3 months  Allegra 24 Hour Allergy oral tablet: 1 tab(s) orally once a day  bepotastine 1.5% ophthalmic solution: 1 drop(s) to each affected eye 2 times a day  biotin: 1 tab(s) orally once a day  calcium: 1 tab(s) orally once a day  cromolyn 4% ophthalmic solution: 1 drop(s) to each affected eye 4 times a day  Digestive Advantage Daily Probiotics oral capsule: 1 cap(s) orally once a day  estradiol 1 mg oral tablet: 1 tab(s) orally once a day (at bedtime)  Fish Oil oral capsule: 3 cap(s) orally once a day  Flarex 0.1% ophthalmic suspension: 1 drop(s) to each affected eye 2 times a day  Flonase 50 mcg/inh nasal spray: 1 spray(s) nasal once a day  hair vitamin: 1 tab(s) orally once a day  levocetirizine 5 mg oral tablet: 1 tab(s) orally once a day (in the evening)  minoxidil 2.5 mg oral tablet: 0.5 tab(s) orally  Nurtec ODT 75 mg oral tablet, disintegratin tab(s) orally once, As Needed  nutrafol: 4 tab(s) orally once a day  pantoprazole 40 mg oral delayed release tablet: 1 tab(s) orally once a day (at bedtime)  Pataday 0.2% ophthalmic solution: 1 drop(s) to each affected eye once a day  ProAir HFA 90 mcg/inh inhalation aerosol: 2 puff(s) inhaled every 6 hours, As Needed  progesterone 100 mg oral capsule: 1 cap(s) orally once a day (at bedtime)  Qsymia 7.5 mg-46 mg oral capsule, extended release: 1 cap(s) orally once a day (in the morning)  Singulair 10 mg oral tablet: 1 tab(s) orally once a day  Synthroid 50 mcg (0.05 mg) oral tablet: 1 tab(s) orally once a day  valACYclovir 500 mg oral tablet: 1 tab(s) orally once a day  Vitamin D2 1.25 mg (50,000 intl units) oral capsule: 1 cap(s) orally 2 times a month (every 15 days)   Ajovy 225 mg/1.5 mL subcutaneous solution: 4.5 milliliter(s) subcutaneous every 3 months  Allegra 24 Hour Allergy oral tablet: 1 tab(s) orally once a day  bepotastine 1.5% ophthalmic solution: 1 drop(s) to each affected eye 2 times a day  biotin: 1 tab(s) orally once a day  calcium: 1 tab(s) orally once a day  cefpodoxime 200 mg oral tablet: 1 tab(s) orally 2 times a day  cromolyn 4% ophthalmic solution: 1 drop(s) to each affected eye 4 times a day  Digestive Advantage Daily Probiotics oral capsule: 1 cap(s) orally once a day  estradiol 1 mg oral tablet: 1 tab(s) orally once a day (at bedtime)  Fish Oil oral capsule: 3 cap(s) orally once a day  Flarex 0.1% ophthalmic suspension: 1 drop(s) to each affected eye 2 times a day  Flonase 50 mcg/inh nasal spray: 1 spray(s) nasal once a day  hair vitamin: 1 tab(s) orally once a day  levocetirizine 5 mg oral tablet: 1 tab(s) orally once a day (in the evening)  loratadine 10 mg oral tablet: 1 tab(s) orally once a day  metroNIDAZOLE 500 mg oral tablet: 1 tab(s) orally 3 times a day  Nurtec ODT 75 mg oral tablet, disintegratin tab(s) orally once, As Needed  nutrafol: 4 tab(s) orally once a day  pantoprazole 40 mg oral delayed release tablet: 1 tab(s) orally once a day (at bedtime)  Pataday 0.2% ophthalmic solution: 1 drop(s) to each affected eye once a day  ProAir HFA 90 mcg/inh inhalation aerosol: 2 puff(s) inhaled every 6 hours, As Needed  progesterone 100 mg oral capsule: 1 cap(s) orally once a day (at bedtime)  Qsymia 7.5 mg-46 mg oral capsule, extended release: 1 cap(s) orally once a day (in the morning)  Singulair 10 mg oral tablet: 1 tab(s) orally once a day  Synthroid 50 mcg (0.05 mg) oral tablet: 1 tab(s) orally once a day  valACYclovir 500 mg oral tablet: 1 tab(s) orally once a day  Vitamin D2 1.25 mg (50,000 intl units) oral capsule: 1 cap(s) orally 2 times a month (every 15 days)

## 2023-05-19 NOTE — ASU PATIENT PROFILE, ADULT - NS PREOP UNDERSTANDS INFO
[Treatment Education] : treatment education [Anticipatory Guidance] : anticipatory guidance [FreeTextEntry1] : Patient with DM\par Patient with his 3rd episode of cellulitis in the groin area in <60 days. No trauma.\par Current area has resolved\par \par IGG levels evaluated\par IGG 4 moderately elevated, but this may be a reflection of his recent infection rather than a cause\par this will need to be followed\par \par \par Plan:\par \par 1. continue with preventative measures\par 2. repeat IGG4 level in 6 months\par 3. f/u PRN new lesions\par \par extensively discussed lab results with the patient yes

## 2023-08-13 ENCOUNTER — EMERGENCY (EMERGENCY)
Facility: HOSPITAL | Age: 58
LOS: 0 days | Discharge: HOME | End: 2023-08-13
Attending: STUDENT IN AN ORGANIZED HEALTH CARE EDUCATION/TRAINING PROGRAM
Payer: COMMERCIAL

## 2023-08-13 VITALS
DIASTOLIC BLOOD PRESSURE: 82 MMHG | OXYGEN SATURATION: 96 % | SYSTOLIC BLOOD PRESSURE: 132 MMHG | TEMPERATURE: 99 F | RESPIRATION RATE: 18 BRPM | HEART RATE: 91 BPM

## 2023-08-13 VITALS — WEIGHT: 139.99 LBS

## 2023-08-13 DIAGNOSIS — Z88.1 ALLERGY STATUS TO OTHER ANTIBIOTIC AGENTS STATUS: ICD-10-CM

## 2023-08-13 DIAGNOSIS — K21.9 GASTRO-ESOPHAGEAL REFLUX DISEASE WITHOUT ESOPHAGITIS: ICD-10-CM

## 2023-08-13 DIAGNOSIS — Z98.890 OTHER SPECIFIED POSTPROCEDURAL STATES: Chronic | ICD-10-CM

## 2023-08-13 DIAGNOSIS — Z88.5 ALLERGY STATUS TO NARCOTIC AGENT: ICD-10-CM

## 2023-08-13 DIAGNOSIS — L29.9 PRURITUS, UNSPECIFIED: ICD-10-CM

## 2023-08-13 DIAGNOSIS — R23.8 OTHER SKIN CHANGES: ICD-10-CM
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
## 2023-08-13 DIAGNOSIS — E03.9 HYPOTHYROIDISM, UNSPECIFIED: ICD-10-CM

## 2023-08-13 DIAGNOSIS — J45.909 UNSPECIFIED ASTHMA, UNCOMPLICATED: ICD-10-CM
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
## 2023-08-13 DIAGNOSIS — Z98.82 BREAST IMPLANT STATUS: ICD-10-CM

## 2023-08-13 DIAGNOSIS — Z88.2 ALLERGY STATUS TO SULFONAMIDES: ICD-10-CM

## 2023-08-13 DIAGNOSIS — Z88.8 ALLERGY STATUS TO OTHER DRUGS, MEDICAMENTS AND BIOLOGICAL SUBSTANCES: ICD-10-CM

## 2023-08-13 DIAGNOSIS — Z91.09 OTHER ALLERGY STATUS, OTHER THAN TO DRUGS AND BIOLOGICAL SUBSTANCES: ICD-10-CM

## 2023-08-13 DIAGNOSIS — Z88.0 ALLERGY STATUS TO PENICILLIN: ICD-10-CM

## 2023-08-13 PROCEDURE — 99283 EMERGENCY DEPT VISIT LOW MDM: CPT

## 2023-08-13 PROCEDURE — 99282 EMERGENCY DEPT VISIT SF MDM: CPT

## 2023-08-13 RX ORDER — PERMETHRIN CREAM 5% W/W 50 MG/G
1 CREAM TOPICAL
Qty: 1 | Refills: 0
Start: 2023-08-13 | End: 2023-08-13

## 2023-08-13 RX ORDER — PERMETHRIN CREAM 5% W/W 50 MG/G
1 CREAM TOPICAL
Qty: 1 | Refills: 0
Start: 2023-08-13 | End: 2023-08-24

## 2023-08-13 NOTE — ED STATDOCS - OBJECTIVE STATEMENT
57 y/o female w/PMHx of hypothyroidism, GERD, hiatal hernia, cervical and lumbar radiculopathy, asthma presents to ED s/p multiple bug bites to entire body. States she had an  come to her home and found "bird mites." Endorses itchy rashes with associated discomfort throughout body. Reports she felt a sore to tip of tongue that was irritating her.

## 2023-08-13 NOTE — ED STATDOCS - PROGRESS NOTE DETAILS
59 y/o female with PMH of hypothyroidism, GERD, hiatal hernia, cervical and lumbar radiculopathy, asthma presents to ED concerned she has mites from her backyard that have been biting her x 1 week. Had  come and concerned about "bird mites." Endorses itchy rash throughout body, states they are coming out of her nose and has a sore on her tongue from them. Pt denies fever, chills, body aches, cp, sob, n/v/d/abd pain. Vital signs stable. On exam a small red destiny to center of anterior chest and left side of face, no other rash or lesions noted. Plan to treat with permethrin and f/u with PMD outpatient. - Roslyn Mary PA-C

## 2023-08-13 NOTE — ED STATDOCS - NSFOLLOWUPINSTRUCTIONS_ED_ALL_ED_FT
Please call and follow up with your doctor in 1-3 days.    Insect Bite or Sting    WHAT YOU NEED TO KNOW:    What do I need to know about an insect bite or sting? Most insect bites and stings are not dangerous and go away without treatment. Common examples of insects that bite or sting are bees, ticks, mosquitoes, spiders, and ants. Insect bites or stings can lead to diseases such as malaria, West Nile virus, Lyme disease, or [place] Spotted Fever.    What are the signs and symptoms of an allergic reaction to an insect bite or sting?    Mild symptoms include a red bump, pain, swelling, and itching.    Anaphylaxis symptoms include throat tightness, trouble breathing, tingling, dizziness, and wheezing. Anaphylaxis is a sudden, life-threatening reaction that needs immediate treatment.  How is an allergic reaction to an insect bite or sting treated? Treatment depends on how severe your symptoms are and if you had anaphylaxis before. You may need any of the following:    Antihistamines decrease mild symptoms such as itching and rash.    Epinephrine is medicine used to treat severe allergic reactions such as anaphylaxis.    A tetanus shot may be given. The shot is a vaccine that helps prevent a bacterial infection. Tetanus booster shots are usually given every 10 years.  What steps do I need to take for signs or symptoms of anaphylaxis?    Immediately give 1 shot of epinephrine only into the outer thigh muscle.  How to Give An Epinephrine Shot Adult      Leave the shot in place as directed. Your healthcare provider may recommend you leave it in place for up to 10 seconds before you remove it. This helps make sure all of the epinephrine is delivered.    Call 911 and go to the emergency department, even if the shot improved symptoms. Do not drive yourself. Bring the used epinephrine shot with you.  What should I do if an insect bites or stings me?    Remove the stinger. Scrape the stinger out with your fingernail, edge of a credit card, or a knife blade. Do not squeeze the wound. Gently wash the area with soap and water.    Remove the tick. Ticks must be removed as soon as possible so you do not get diseases passed through tick bites. Ask your healthcare provider for more information on tick bites and how to remove ticks.  What can I do to care for my bite or sting wound?    Elevate (raise) the area above the level of your heart, if possible. Prop the area on pillows to keep it raised comfortably. Elevate the area for 10 to 20 minutes each hour or as directed by your healthcare provider.        Apply compresses. Soak a clean washcloth in cold water, wring it out, and put it on the bite or sting. Use the compress for 10 to 20 minutes each hour or as directed by your healthcare provider. After 24 to 48 hours, change to warm compresses.    Apply a baking soda paste. Add water to baking soda to make a thick paste. Put the paste on the area for 5 minutes. Rinse gently to remove the paste.  What safety precautions do I need to take if I am at risk for anaphylaxis?    Keep 2 shots of epinephrine with you at all times. You may need a second shot, because epinephrine only works for about 20 minutes and symptoms may return. Your healthcare provider can show you and family members how to give the shot. Check the expiration date every month and replace it before it expires.    Create an action plan. Your healthcare provider can help you create a written plan that explains the allergy and an emergency plan to treat a reaction. The plan explains when to give a second epinephrine shot if symptoms return or do not improve after the first. Give copies of the action plan and emergency instructions to family members, work and school staff, and  providers. Show them how to give a shot of epinephrine.    Carry medical alert identification. Wear medical alert jewelry or carry a card that says you have an insect allergy. Ask your healthcare provider where to get these items.  Medical Alert Jewelry  What can I do to prevent an insect bite or sting?    Do not wear bright-colored or flower-print clothing when you plan to spend time outdoors. Do not use hairspray, perfumes, or aftershave.    Do not leave food out.    Empty any standing water. Wash containers with soap and water every 2 days.    Put screens on all open windows and doors.    Put insect repellent that contains DEET on skin that is showing when you go outside. Put insect repellent at the top of your boots, bottom of pant legs, and sleeve cuffs. Wear long sleeves, pants, and shoes.    Use citronella candles outdoors to help keep mosquitoes away. Put a tick and flea collar on pets.  Call your local emergency number (911 in the [place]) for signs or symptoms of anaphylaxis, such as trouble breathing, swelling in your mouth or throat, or wheezing. You may also have itching, a rash, hives, or feel like you are going to faint.    When should I seek immediate care?    You are stung on your tongue or in your throat.    A white area forms around the bite.    You are sweating badly or have body pain.    You think you were bitten or stung by a poisonous insect.  When should I call my doctor?    You have a fever.    The area becomes red, warm, tender, and swollen beyond the area of the bite or sting.    You have questions or concerns about your condition or care.  CARE AGREEMENT:    You have the right to help plan your care. Learn about your health condition and how it may be treated. Discuss treatment options with your healthcare providers to decide what care you want to receive. You always have the right to refuse treatment.    © [employer] 1973, 2023

## 2023-08-13 NOTE — ED STATDOCS - NS ED ATTENDING STATEMENT MOD
This was a shared visit with the DAMION. I reviewed and verified the documentation and independently performed the documented:

## 2023-08-13 NOTE — ED ADULT NURSE REASSESSMENT NOTE - NS ED NURSE REASSESS COMMENT FT1
Pt upset and wanted to leave. Pt handed discharge paperwork and was asked if she wanted to speak with nursing administration. Pt denied. Pt refused vital signs.

## 2023-08-13 NOTE — ED STATDOCS - CARE PROVIDER_API CALL
Wesley Ruff  Infectious Disease  [address], [address]  [place] [address]  Phone: [phone]  Fax: [phone]  Follow Up Time:

## 2023-08-13 NOTE — ED STATDOCS - PATIENT PORTAL LINK FT
You can access the FollowMyHealth Patient Portal offered by [facility] by registering at the following website: [email or web address]. By joining [employer]’s FollowMyHealth portal, you will also be able to view your health information using other applications (apps) compatible with our system.

## 2023-08-13 NOTE — ED STATDOCS - ATTENDING APP SHARED VISIT CONTRIBUTION OF CARE
I, Ren Ying, DO personally saw the patient with [facility].  I have personally performed a face to face diagnostic evaluation on this patient.  I have reviewed the [facility]ION note and agree with the history, exam, and plan of care, except as noted.  I personally saw the patient and performed a substantive portion of the visit including all aspects of the medical decision making.

## 2023-08-13 NOTE — ED STATDOCS - CLINICAL SUMMARY MEDICAL DECISION MAKING FREE TEXT BOX
adult F concerned that she was bit by mites from her backyard. vitals are normal. no fevers. on exam, she has mild rash., will treat with permethrin and d/c with outpt follow up.

## 2023-08-13 NOTE — ED STATDOCS - SKIN, MLM
+one red dot to center of chest and one red dot to L side of face. no rash in webbings of fingers or wrist, or around her neck where her shirt collar lies.

## 2023-08-14 PROBLEM — E03.9 HYPOTHYROIDISM, UNSPECIFIED: Chronic | Status: ACTIVE | Noted: 2023-04-15

## 2023-08-18 ENCOUNTER — EMERGENCY (EMERGENCY)
Facility: HOSPITAL | Age: 58
LOS: 1 days | Discharge: HOME | End: 2023-08-18
Attending: STUDENT IN AN ORGANIZED HEALTH CARE EDUCATION/TRAINING PROGRAM
Payer: COMMERCIAL

## 2023-08-18 VITALS
HEART RATE: 75 BPM | DIASTOLIC BLOOD PRESSURE: 89 MMHG | RESPIRATION RATE: 18 BRPM | SYSTOLIC BLOOD PRESSURE: 132 MMHG | WEIGHT: 139.99 LBS | HEIGHT: 63 IN | TEMPERATURE: 98 F

## 2023-08-18 VITALS
HEART RATE: 82 BPM | SYSTOLIC BLOOD PRESSURE: 133 MMHG | OXYGEN SATURATION: 97 % | DIASTOLIC BLOOD PRESSURE: 94 MMHG | TEMPERATURE: 97 F | RESPIRATION RATE: 18 BRPM

## 2023-08-18 DIAGNOSIS — Z98.890 OTHER SPECIFIED POSTPROCEDURAL STATES: Chronic | ICD-10-CM

## 2023-08-18 LAB
APPEARANCE UR: CLEAR — SIGNIFICANT CHANGE UP
BACTERIA # UR AUTO: NEGATIVE — SIGNIFICANT CHANGE UP
BILIRUB UR-MCNC: NEGATIVE — SIGNIFICANT CHANGE UP
COLOR SPEC: COLORLESS — SIGNIFICANT CHANGE UP
DIFF PNL FLD: NEGATIVE — SIGNIFICANT CHANGE UP
EPI CELLS # UR: 2 /HPF — SIGNIFICANT CHANGE UP
GLUCOSE UR QL: NEGATIVE — SIGNIFICANT CHANGE UP
HYALINE CASTS # UR AUTO: 1 /LPF — SIGNIFICANT CHANGE UP (ref 0–2)
KETONES UR-MCNC: NEGATIVE — SIGNIFICANT CHANGE UP
LEUKOCYTE ESTERASE UR-ACNC: NEGATIVE — SIGNIFICANT CHANGE UP
NITRITE UR-MCNC: NEGATIVE — SIGNIFICANT CHANGE UP
PH UR: 8 — SIGNIFICANT CHANGE UP (ref 5–8)
PROT UR-MCNC: NEGATIVE — SIGNIFICANT CHANGE UP
RBC CASTS # UR COMP ASSIST: 1 /HPF — SIGNIFICANT CHANGE UP (ref 0–4)
SP GR SPEC: 1.01 — LOW (ref 1.01–1.02)
UROBILINOGEN FLD QL: NEGATIVE — SIGNIFICANT CHANGE UP
WBC UR QL: 0 /HPF — SIGNIFICANT CHANGE UP (ref 0–5)

## 2023-08-18 PROCEDURE — 99284 EMERGENCY DEPT VISIT MOD MDM: CPT

## 2023-08-18 PROCEDURE — 87086 URINE CULTURE/COLONY COUNT: CPT

## 2023-08-18 PROCEDURE — 87077 CULTURE AEROBIC IDENTIFY: CPT

## 2023-08-18 PROCEDURE — 81001 URINALYSIS AUTO W/SCOPE: CPT

## 2023-08-18 PROCEDURE — 87186 SC STD MICRODIL/AGAR DIL: CPT

## 2023-08-18 NOTE — ED PROVIDER NOTE - PATIENT PORTAL LINK FT
You can access the FollowMyHealth Patient Portal offered by [facility] by registering at the following website: [email or web address]. By joining [employer]’s FollowMyHealth portal, you will also be able to view your health information using other applications (apps) compatible with our system.

## 2023-08-18 NOTE — ED PROVIDER NOTE - CARE PLAN
1 Principal Discharge DX:	Rash  Assessment and plan of treatment:	Reported bug bites and bug infestation. Pt hasn't taken premethrin because "she wasn't told what is was" at OSH.  Plan for   Principal Discharge DX:	Rash  Assessment and plan of treatment:	Reported bug bites and bug infestation. Pt hasn't taken premethrin because "she wasn't told what is was" at OSH.  Plan for oral ivermectin, topical permethrin   Principal Discharge DX:	Rash  Assessment and plan of treatment:	Reported bug bites and bug infestation. Pt hasn't taken premethrin because "she wasn't told what is was" at OSH.  Plan for topical permethrin

## 2023-08-18 NOTE — ED PROVIDER NOTE - CLINICAL SUMMARY MEDICAL DECISION MAKING FREE TEXT BOX
Reported bug bites and bug infestation. Pt hasn't taken premethrin because "she wasn't told what is was" at OSH.  Plan for Reported bug bites and bug infestation. Pt hasn't taken premethrin because "she wasn't told what is was" at OSH.  Plan for oral ivermectin, topical permethrin Reported bug bites and bug infestation. Pt hasn't taken premethrin because "she wasn't told what is was" at OSH.  Plan for topical permethrin

## 2023-08-18 NOTE — ED PROVIDER NOTE - PHYSICAL EXAMINATION
PHYSICAL EXAM:  GENERAL: NAD, Resting in bed  HEENT:  Head atraumatic, EOMI, PERRLA, conjunctiva and sclera clear; Moist mucous membranes, normal oropharynx  NECK: Supple, No JVD, no lymphadenopathy, no thyroid nodules or enlargement  CHEST/LUNG: Clear to auscultation bilaterally; No rales, rhonchi, wheezing, or rubs. Unlabored respirations on room air  HEART: Regular rate and rhythm; No murmurs, rubs, or gallops  ABDOMEN: Bowel sounds present; Soft, Nontender, Nondistended. No hepatomegally  EXTREMITIES:  2+ Peripheral Pulses, brisk capillary refill. No clubbing, cyanosis, or edema  NERVOUS SYSTEM:  Alert & Oriented X3, non-focal and spontaneous movements of all extremities  SKIN: Red linear marks on bilateral lower extremities.

## 2023-08-18 NOTE — ED PROVIDER NOTE - OBJECTIVE STATEMENT
59 y/o female with PMH of hypothyroidism, GERD, hiatal hernia, cervical and lumbar radiculopathy, asthma presents to ED concerned she has mites from her backyard that have been biting her x 1 week. She says she was gardening when a bird nest fell full of bugs fell on her face. She since says she found tiny bugs in her food, her clothes, her mouth, her eyes, under her skin. Had  come who told her she has a "bird mite" infestation. Endorses itchy rash throughout body, states they are coming out of her nose and has a sore on her tongue from them. Pt denies fever, chills, body aches, cp, sob, n/v/d/abd pain.

## 2023-08-18 NOTE — ED ADULT NURSE NOTE - OBJECTIVE STATEMENT
Pt is a 58 y.o female c/o allergic reaction. Pt is A&Ox3 and resting in stretcher. PT reports she had "bites from bug mites" from a "tree she cut down in her yard". PT endorses runny nose and "itchy throat". Spontaneously breathing on RA>95%, patent airway, peripheral pulses present. skin dry and intact, diffuse areas of redness to LE,  no redness to upper extremity or face. Pt denies any CP, SOB, headache, fever, N/V/D. Safety and comfort measures in place bed in lowest position, siderails upright, call bell within reach.

## 2023-08-18 NOTE — ED ADULT NURSE NOTE - CHIEF COMPLAINT QUOTE
concerned she is having an allergic reaction from "bird mites" has seen PCP, ophthalmologist, and has been seen at [facility]. states cough, nasal congestion, and itchy throat and "critters in skin"

## 2023-08-18 NOTE — ED PROVIDER NOTE - ATTENDING CONTRIBUTION TO CARE
I, Dr. Laxmi Romero, have personally performed a face to face medical and diagnostic evaluation of the patient. I have discussed with and reviewed the Resident's and/or ACP's and/or Medical/PA/NP student's note and agree with the History, ROS, Physical Exam and MDM unless otherwise indicated. A brief summary of my personal evaluation and impression can be found below.    Patient is a 58-year-old female with history of hypothyroidism, GERD, hiatal hernia, cervical/lumbar radiculopathy, asthma, multiple reported drug allergies presenting with concern for rash in the setting of possible "bird mite" infestation.  Patient states that she was in her garden and knocked over a bird nest and felt bugs in her face.  Throughout the week she has been finding tiny bugs/eggs in her food, clothes, throughout her body including her eyes and vagina/rectum.  She was told by her  that she had a bird mite infestation and he is attempting to rid them of her house.  She presented to outside hospital for the rash and was started on permethrin topical but did not start it because the cream was not explained to her.  Has been having some dysuria, denies abdominal pain, hematuria, fevers, chills.    Exam: Well-appearing, well-dressed female, 2 linear excoriations of bilateral volar aspect of wrist, otherwise no apparent rash,  exam performed with RN Charmaine Conti as chaperone, normal external genitalia/rectum, no rashes, vaginal canal without erythema.  No appreciable bugs/eggs on entirety of skin exam. Conjunctiva/oral mucosa normal.     Patient presenting with concern for rash in the setting of "bird mite" infestation.  Aside from 2 linear excoriations of bilateral wrists there is no evidence to suggest extensive infestation that patient reports.  Given linear rash possible scabies infection of which permethrin would be appropriate treatment.  There is a slight concern for persistent delusion.  Patient does appear well kempt, not displaying any other concerning psychiatric features, feels safe at home, denies SI/HI/AH/VH.  Attempted to contact patient's sister who patient states is a nurse and agreed to obtain collateral from.  Left message but did not hear back.  Patient overall does not appear to be a risk to herself or others despite bizarre complaint.  Will obtain urine studies to assess for infection anticipate discharge with recommendation to start permethrin treatment.

## 2023-08-18 NOTE — ED PROVIDER NOTE - NSFOLLOWUPINSTRUCTIONS_ED_ALL_ED_FT
We evaluated you here for a bug infestation. We recommend that you apply permethrin cream to the whole body as directed. If the issue doesn't resolve, please see your PCP.

## 2023-08-19 NOTE — ED POST DISCHARGE NOTE - ADDITIONAL DOCUMENTATION
8/19/23: Pt called asking about urine results, I told patient urine culture still pending, with +culture would receive a call back. Patient also asked about her prescription, stating that she was told that she would be sent an oral medication. I reviewed chart, only cream mentioned. Reiterated importance of follow up with PCP. -Niki Saucedo PA-C

## 2023-08-19 NOTE — ED POST DISCHARGE NOTE - DETAILS
8/22:  no urinary complints wouldn't treat.  Pt stating she still has scabies everywhere, said ID would not give her an appointment, will flag her chart with [facility] to get her follow up.  Charlene

## 2023-11-09 ENCOUNTER — APPOINTMENT (OUTPATIENT)
Dept: GASTROENTEROLOGY | Facility: CLINIC | Age: 58
End: 2023-11-09
Payer: COMMERCIAL

## 2023-11-09 VITALS
OXYGEN SATURATION: 99 % | BODY MASS INDEX: 23.04 KG/M2 | HEART RATE: 86 BPM | TEMPERATURE: 97.1 F | WEIGHT: 130 LBS | HEIGHT: 63 IN | DIASTOLIC BLOOD PRESSURE: 72 MMHG | SYSTOLIC BLOOD PRESSURE: 110 MMHG

## 2023-11-09 DIAGNOSIS — K64.8 OTHER HEMORRHOIDS: ICD-10-CM

## 2023-11-09 DIAGNOSIS — K21.00 GASTRO-ESOPHAGEAL REFLUX DISEASE WITH ESOPHAGITIS, WITHOUT BLEEDING: ICD-10-CM

## 2023-11-09 DIAGNOSIS — K58.1 IRRITABLE BOWEL SYNDROME WITH CONSTIPATION: ICD-10-CM

## 2023-11-09 DIAGNOSIS — K57.90 DIVERTICULOSIS OF INTESTINE, PART UNSPECIFIED, W/OUT PERFORATION OR ABSCESS W/OUT BLEEDING: ICD-10-CM

## 2023-11-09 DIAGNOSIS — K21.9 DIAPHRAGMATIC HERNIA W/OUT OBSTRUCTION OR GANGRENE: ICD-10-CM

## 2023-11-09 DIAGNOSIS — K44.9 DIAPHRAGMATIC HERNIA W/OUT OBSTRUCTION OR GANGRENE: ICD-10-CM

## 2023-11-09 DIAGNOSIS — Z86.010 PERSONAL HISTORY OF COLONIC POLYPS: ICD-10-CM

## 2023-11-09 DIAGNOSIS — R10.13 EPIGASTRIC PAIN: ICD-10-CM

## 2023-11-09 PROCEDURE — 99204 OFFICE O/P NEW MOD 45 MIN: CPT

## 2023-11-09 RX ORDER — HYDROCORTISONE 2.5% 25 MG/G
2.5 CREAM TOPICAL
Qty: 1 | Refills: 3 | Status: ACTIVE | COMMUNITY
Start: 2023-11-09 | End: 1900-01-01

## 2023-11-09 RX ORDER — IVERMECTIN 3 MG/1
3 TABLET ORAL
Refills: 0 | Status: ACTIVE | COMMUNITY

## 2023-11-09 RX ORDER — PERMETHRIN 50 MG/G
5 CREAM TOPICAL
Refills: 0 | Status: ACTIVE | COMMUNITY

## 2023-11-09 RX ORDER — LEVOFLOXACIN 5 MG/ML
500 INJECTION, SOLUTION INTRAVENOUS
Refills: 0 | Status: ACTIVE | COMMUNITY

## 2023-11-09 RX ORDER — HYDROXYZINE HYDROCHLORIDE 50 MG/1
TABLET ORAL
Refills: 0 | Status: ACTIVE | COMMUNITY

## 2023-11-09 RX ORDER — PANTOPRAZOLE 40 MG/1
40 TABLET, DELAYED RELEASE ORAL
Qty: 1 | Refills: 3 | Status: ACTIVE | COMMUNITY
Start: 2023-11-09 | End: 1900-01-01

## 2023-11-17 NOTE — H&P ADULT - NSHPLABSRESULTS_GEN_ALL_CORE
Encounter Date: 11/17/2023       History     Chief Complaint   Patient presents with    Knee Pain     Pt stated that he began experiencing right knee pain since Tuesday at school. No known trauma.      This note is dictated on M*Modal word recognition program.  There are word recognition mistakes and grammatical errors that are occasionally missed on review.     Francis Velez is a 9 y.o. male presents to ER today with complaints of right knee pain.  Patient denies injury or trauma.  Mother reports patient has history of right knee spur in the past.  Patient reports 2/10 right knee pain.    The history is provided by the mother and the patient.     Review of patient's allergies indicates:  No Known Allergies  History reviewed. No pertinent past medical history.  Past Surgical History:   Procedure Laterality Date    CIRCUMCISION       Family History   Problem Relation Age of Onset    Eczema Mother     Eczema Brother      Social History     Tobacco Use    Smoking status: Every Day     Types: Cigarettes     Passive exposure: Current    Smokeless tobacco: Never   Substance Use Topics    Alcohol use: No     Review of Systems   Constitutional: Negative.    HENT: Negative.     Eyes: Negative.    Respiratory: Negative.     Cardiovascular: Negative.    Gastrointestinal: Negative.    Endocrine: Negative.    Genitourinary: Negative.    Musculoskeletal:  Positive for arthralgias.   Skin: Negative.    Allergic/Immunologic: Negative.    Neurological: Negative.    Hematological: Negative.    Psychiatric/Behavioral: Negative.         Physical Exam     Initial Vitals [11/17/23 0944]   BP Pulse Resp Temp SpO2   102/68 88 18 98.5 °F (36.9 °C) 99 %      MAP       --         Physical Exam    Constitutional: He is active.   HENT:   Head: Atraumatic.   Left Ear: Tympanic membrane normal.   Nose: Nose normal. No nasal discharge.   Mouth/Throat: Dentition is normal. No dental caries. No tonsillar exudate.   Eyes: Pupils are equal,  round, and reactive to light.   Cardiovascular:  Normal rate, regular rhythm and S2 normal.           Pulmonary/Chest: Effort normal and breath sounds normal. No stridor. No respiratory distress. Expiration is prolonged. Air movement is not decreased. He has no rales. He exhibits no retraction.   Abdominal: Bowel sounds are normal. He exhibits no distension and no mass. There is no hepatosplenomegaly. There is no abdominal tenderness. No hernia. There is no rebound and no guarding.   Musculoskeletal:         General: No tenderness, deformity or signs of injury.     Neurological: He is alert. No cranial nerve deficit. Coordination normal. GCS score is 15. GCS eye subscore is 4. GCS verbal subscore is 5. GCS motor subscore is 6.   Skin: Capillary refill takes less than 2 seconds. No rash noted.         ED Course   Procedures  Labs Reviewed - No data to display       Imaging Results              X-Ray Knee 1 or 2 View Right (Final result)  Result time 11/17/23 10:29:43      Final result by Reynaldo Pineda MD (11/17/23 10:29:43)                   Impression:      As above.      Electronically signed by: Reyanldo Pineda MD  Date:    11/17/2023  Time:    10:29               Narrative:    EXAMINATION:  XR KNEE 1 OR 2 VIEW RIGHT    CLINICAL HISTORY:  Right knee pain    COMPARISON:  None    FINDINGS:  No fracture or dislocation identified.  There may be a small joint effusion.  Soft tissues are unremarkable.                                       Medications - No data to display  Medical Decision Making  Differential diagnosis include knee sprain, knee fracture, overuse syndrome, Osgood-Schlatter disease     X-ray of right knee has the following findings--per Radiology  FINDINGS:   No fracture or dislocation identified.  There may be a small joint effusion.  Soft tissues are unremarkable.   Right lower extremity is neurovascular intact on examination today.  Instructed to alternate Tylenol and Motrin per package directions to help  control right knee pain and to follow-up with pediatrician.  Patient stable at time of discharge in no acute distress mother instructed she may return to ER if patient experiences any worsening or concerning symptoms.        Amount and/or Complexity of Data Reviewed  Radiology: ordered.                                   Clinical Impression:  Final diagnoses:  [M25.561] Knee pain, right                 Eliel Goetz, NP  11/17/23 1053     12.9   7.59  )-----------( 235      ( 15 Apr 2023 16:52 )             36.9     -15    140  |  113<H>  |  10  ----------------------------<  81  3.4<L>   |  22  |  0.76    Ca    9.0      15 Apr 2023 16:52    TPro  7.5  /  Alb  3.5  /  TBili  0.5  /  DBili  x   /  AST  20  /  ALT  23  /  AlkPhos  73  04-15    Urinalysis Basic - ( 15 Apr 2023 18:44 )    Color: Pale Yellow / Appearance: Clear / S.005 / pH: x  Gluc: x / Ketone: Negative  / Bili: Negative / Urobili: Negative   Blood: x / Protein: Negative / Nitrite: Negative   Leuk Esterase: Negative / RBC: 0-2 /HPF / WBC 0-2   Sq Epi: x / Non Sq Epi: x / Bacteria: Occasional  ------------------------------------------------------------------------------  ACC: [id number] EXAM:  CT ABDOMEN AND PELVIS IC   ORDERED BY: ARLEEN STOVER   PROCEDURE DATE:  04/15/2023      FINDINGS:  LOWER CHEST: Partially imaged bilateral breast implants.    LIVER: Numerous cysts and additional hypodensities too small to   characterize. Hyperenhancing 6 mm right hepatic lobe lesion (2, 20),   indeterminate but could reflect flash hemangioma. 2.6 cm left hepatic   lobe lesion with peripheral nodular enhancement most likely reflecting   hemangioma (2, 24).  BILE DUCTS: Normal caliber.  GALLBLADDER: Within normal limits.  SPLEEN: Within normal limits.  PANCREAS: Within normal limits.  ADRENALS: Within normal limits.  KIDNEYS/URETERS: No hydronephrosis. Homogeneous symmetric renal   enhancement. Duplicated left renal collecting system.    BLADDER: Within normal limits.  REPRODUCTIVE ORGANS: Uterus and adnexa within normal limits.    BOWEL: No bowel obstruction. Appendix is normal. Colonic diverticulosis.   Focal wall thickening and inflammatory stranding involving the proximal   sigmoid colon compatible with acute diverticulitis. 2.4 x 1.1 x 0.8 cm   ovoid hypodensity in this region suspected to reflect early developing   intramural abscess (2, 80). No free air.  PERITONEUM: No ascites.  VESSELS: Within normal limits.  RETROPERITONEUM/LYMPH NODES: No lymphadenopathy.  ABDOMINAL WALL: Small fat-containing umbilical hernia.  BONES: Degenerative changes.    IMPRESSION:  Acute sigmoid diverticulitis with suspected small intramural abscess   measuring up to 2.4 cm. No free air.    --- End of Report ---  ROSARIO COPELAND MD; Attending Radiologist

## 2023-11-21 RX ORDER — AMITRIPTYLINE HYDROCHLORIDE 10 MG/1
10 TABLET, FILM COATED ORAL
Qty: 270 | Refills: 3 | Status: ACTIVE | COMMUNITY
Start: 2023-11-09 | End: 1900-01-01

## 2023-12-08 ENCOUNTER — NON-APPOINTMENT (OUTPATIENT)
Age: 58
End: 2023-12-08

## 2024-04-08 NOTE — PROGRESS NOTE ADULT - PROBLEM/PLAN-2
DISPLAY PLAN FREE TEXT
PROVIDER:[TOKEN:[[id number]],FOLLOWUP:[1 week]]

## 2024-11-26 ENCOUNTER — NON-APPOINTMENT (OUTPATIENT)
Age: 59
End: 2024-11-26

## 2024-11-26 ENCOUNTER — INPATIENT (INPATIENT)
Facility: HOSPITAL | Age: 59
LOS: 2 days | Discharge: HOME | DRG: 552 | End: 2024-11-29
Attending: STUDENT IN AN ORGANIZED HEALTH CARE EDUCATION/TRAINING PROGRAM | Admitting: STUDENT IN AN ORGANIZED HEALTH CARE EDUCATION/TRAINING PROGRAM
Payer: COMMERCIAL

## 2024-11-26 VITALS
SYSTOLIC BLOOD PRESSURE: 133 MMHG | OXYGEN SATURATION: 98 % | HEIGHT: 63 IN | RESPIRATION RATE: 20 BRPM | HEART RATE: 98 BPM | TEMPERATURE: 98 F | WEIGHT: 145.06 LBS | DIASTOLIC BLOOD PRESSURE: 84 MMHG

## 2024-11-26 DIAGNOSIS — Z98.890 OTHER SPECIFIED POSTPROCEDURAL STATES: Chronic | ICD-10-CM

## 2024-11-26 DIAGNOSIS — M54.9 DORSALGIA, UNSPECIFIED: ICD-10-CM

## 2024-11-26 LAB
ALBUMIN SERPL ELPH-MCNC: 4.5 G/DL — SIGNIFICANT CHANGE UP (ref 3.3–5)
ALP SERPL-CCNC: 84 U/L — SIGNIFICANT CHANGE UP (ref 40–120)
ALT FLD-CCNC: 22 U/L — SIGNIFICANT CHANGE UP (ref 10–45)
ANION GAP SERPL CALC-SCNC: 17 MMOL/L — SIGNIFICANT CHANGE UP (ref 5–17)
AST SERPL-CCNC: 28 U/L — SIGNIFICANT CHANGE UP (ref 10–40)
BASOPHILS # BLD AUTO: 0.04 K/UL — SIGNIFICANT CHANGE UP (ref 0–0.2)
BASOPHILS NFR BLD AUTO: 0.4 % — SIGNIFICANT CHANGE UP (ref 0–2)
BILIRUB SERPL-MCNC: 1 MG/DL — SIGNIFICANT CHANGE UP (ref 0.2–1.2)
BUN SERPL-MCNC: 15 MG/DL — SIGNIFICANT CHANGE UP (ref 7–23)
CALCIUM SERPL-MCNC: 9.8 MG/DL — SIGNIFICANT CHANGE UP (ref 8.4–10.5)
CHLORIDE SERPL-SCNC: 98 MMOL/L — SIGNIFICANT CHANGE UP (ref 96–108)
CO2 SERPL-SCNC: 24 MMOL/L — SIGNIFICANT CHANGE UP (ref 22–31)
CREAT SERPL-MCNC: 0.81 MG/DL — SIGNIFICANT CHANGE UP (ref 0.5–1.3)
EGFR: 84 ML/MIN/1.73M2 — SIGNIFICANT CHANGE UP
EOSINOPHIL # BLD AUTO: 0.02 K/UL — SIGNIFICANT CHANGE UP (ref 0–0.5)
EOSINOPHIL NFR BLD AUTO: 0.2 % — SIGNIFICANT CHANGE UP (ref 0–6)
GLUCOSE SERPL-MCNC: 72 MG/DL — SIGNIFICANT CHANGE UP (ref 70–99)
HCT VFR BLD CALC: 40.8 % — SIGNIFICANT CHANGE UP (ref 34.5–45)
HGB BLD-MCNC: 14 G/DL — SIGNIFICANT CHANGE UP (ref 11.5–15.5)
IMM GRANULOCYTES NFR BLD AUTO: 0.3 % — SIGNIFICANT CHANGE UP (ref 0–0.9)
LYMPHOCYTES # BLD AUTO: 0.94 K/UL — LOW (ref 1–3.3)
LYMPHOCYTES # BLD AUTO: 9.4 % — LOW (ref 13–44)
MCHC RBC-ENTMCNC: 33 PG — SIGNIFICANT CHANGE UP (ref 27–34)
MCHC RBC-ENTMCNC: 34.3 G/DL — SIGNIFICANT CHANGE UP (ref 32–36)
MCV RBC AUTO: 96.2 FL — SIGNIFICANT CHANGE UP (ref 80–100)
MONOCYTES # BLD AUTO: 0.76 K/UL — SIGNIFICANT CHANGE UP (ref 0–0.9)
MONOCYTES NFR BLD AUTO: 7.6 % — SIGNIFICANT CHANGE UP (ref 2–14)
NEUTROPHILS # BLD AUTO: 8.22 K/UL — HIGH (ref 1.8–7.4)
NEUTROPHILS NFR BLD AUTO: 82.1 % — HIGH (ref 43–77)
NRBC # BLD: 0 /100 WBCS — SIGNIFICANT CHANGE UP (ref 0–0)
PLATELET # BLD AUTO: 254 K/UL — SIGNIFICANT CHANGE UP (ref 150–400)
POTASSIUM SERPL-MCNC: 3.4 MMOL/L — LOW (ref 3.5–5.3)
POTASSIUM SERPL-SCNC: 3.4 MMOL/L — LOW (ref 3.5–5.3)
PROT SERPL-MCNC: 7.7 G/DL — SIGNIFICANT CHANGE UP (ref 6–8.3)
RBC # BLD: 4.24 M/UL — SIGNIFICANT CHANGE UP (ref 3.8–5.2)
RBC # FLD: 12.9 % — SIGNIFICANT CHANGE UP (ref 10.3–14.5)
SODIUM SERPL-SCNC: 139 MMOL/L — SIGNIFICANT CHANGE UP (ref 135–145)
WBC # BLD: 10.01 K/UL — SIGNIFICANT CHANGE UP (ref 3.8–10.5)
WBC # FLD AUTO: 10.01 K/UL — SIGNIFICANT CHANGE UP (ref 3.8–10.5)

## 2024-11-26 PROCEDURE — 72125 CT NECK SPINE W/O DYE: CPT | Mod: 26,MC

## 2024-11-26 PROCEDURE — 99285 EMERGENCY DEPT VISIT HI MDM: CPT

## 2024-11-26 PROCEDURE — 99223 1ST HOSP IP/OBS HIGH 75: CPT

## 2024-11-26 PROCEDURE — 72131 CT LUMBAR SPINE W/O DYE: CPT | Mod: 26,MC

## 2024-11-26 RX ORDER — ACETAMINOPHEN, DIPHENHYDRAMINE HCL, PHENYLEPHRINE HCL 325; 25; 5 MG/1; MG/1; MG/1
3 TABLET ORAL AT BEDTIME
Refills: 0 | Status: DISCONTINUED | OUTPATIENT
Start: 2024-11-26 | End: 2024-11-29

## 2024-11-26 RX ORDER — ACETAMINOPHEN 500MG 500 MG/1
1000 TABLET, COATED ORAL ONCE
Refills: 0 | Status: DISCONTINUED | OUTPATIENT
Start: 2024-11-26 | End: 2024-11-26

## 2024-11-26 RX ORDER — METHOCARBAMOL 500 MG/1
750 TABLET, FILM COATED ORAL ONCE
Refills: 0 | Status: COMPLETED | OUTPATIENT
Start: 2024-11-26 | End: 2024-11-26

## 2024-11-26 RX ORDER — LIDOCAINE 40 MG/G
1 CREAM TOPICAL ONCE
Refills: 0 | Status: COMPLETED | OUTPATIENT
Start: 2024-11-26 | End: 2024-11-26

## 2024-11-26 RX ORDER — ACETAMINOPHEN 500MG 500 MG/1
650 TABLET, COATED ORAL EVERY 6 HOURS
Refills: 0 | Status: DISCONTINUED | OUTPATIENT
Start: 2024-11-26 | End: 2024-11-27

## 2024-11-26 RX ORDER — ACETAMINOPHEN 500MG 500 MG/1
1000 TABLET, COATED ORAL ONCE
Refills: 0 | Status: COMPLETED | OUTPATIENT
Start: 2024-11-26 | End: 2024-11-26

## 2024-11-26 RX ORDER — KETOROLAC TROMETHAMINE 30 MG/ML
15 INJECTION INTRAMUSCULAR; INTRAVENOUS ONCE
Refills: 0 | Status: DISCONTINUED | OUTPATIENT
Start: 2024-11-26 | End: 2024-11-26

## 2024-11-26 RX ADMIN — KETOROLAC TROMETHAMINE 15 MILLIGRAM(S): 30 INJECTION INTRAMUSCULAR; INTRAVENOUS at 21:45

## 2024-11-26 RX ADMIN — ACETAMINOPHEN 500MG 1000 MILLIGRAM(S): 500 TABLET, COATED ORAL at 20:25

## 2024-11-26 RX ADMIN — METHOCARBAMOL 750 MILLIGRAM(S): 500 TABLET, FILM COATED ORAL at 21:45

## 2024-11-26 RX ADMIN — ACETAMINOPHEN 500MG 400 MILLIGRAM(S): 500 TABLET, COATED ORAL at 19:38

## 2024-11-26 NOTE — H&P ADULT - NSHPREVIEWOFSYSTEMS_GEN_ALL_CORE
Review of Systems:   CONSTITUTIONAL: No fever, weight loss  EYES: No eye pain, visual disturbances, or discharge  RESPIRATORY: No SOB. No cough, wheezing, chills or hemoptysis  CARDIOVASCULAR: No chest pain, palpitations, dizziness, or leg swelling  GASTROINTESTINAL: No abdominal or epigastric pain. No nausea, vomiting, or hematemesis; No diarrhea or constipation. No melena or hematochezia.  GENITOURINARY: No dysuria, frequency, hematuria, or incontinence  NEUROLOGICAL: No headaches, memory loss, loss of strength, numbness, or tremors  SKIN: No itching, burning, rashes, or lesions   MUSCULOSKELETAL: No joint pain or swelling; No muscle, back pain  PSYCHIATRIC: No depression, anxiety, mood swings, or difficulty sleeping  HEME/LYMPH: No easy bruising, or bleeding gums Review of Systems:   CONSTITUTIONAL: No fever, weight loss  EYES: No eye pain, visual disturbances, or discharge  RESPIRATORY: No SOB. No cough, wheezing, chills or hemoptysis  CARDIOVASCULAR: No chest pain, palpitations, dizziness, or leg swelling  GASTROINTESTINAL: No abdominal or epigastric pain. No nausea, vomiting, or hematemesis; No diarrhea or constipation. No melena or hematochezia.  GENITOURINARY: No dysuria, frequency, hematuria, or incontinence  NEUROLOGICAL: +numbness and tingling in left leg; No headaches, memory loss, loss of strength, or tremors  SKIN: No itching, burning, rashes, or lesions   MUSCULOSKELETAL: +Left lower back pain  PSYCHIATRIC: No depression, anxiety, mood swings, or difficulty sleeping  HEME/LYMPH: No easy bruising, or bleeding gums

## 2024-11-26 NOTE — ED ADULT NURSE REASSESSMENT NOTE - NS ED NURSE REASSESS COMMENT FT1
On re-assessment pt currently  in stretcher in room with appropriate side rails up for safety. pt is awake and alert, vital signs stable, breathing even and unlabored, NAD noted at this time. Plan of care discussed with pt. Pt pending CT results. Pt endorsing 8/10 back pain radiating down left leg to left foot. States she has not been able to ambulate due to increased pain. Ofirmev administered as ordered.

## 2024-11-26 NOTE — H&P ADULT - PROBLEM SELECTOR PLAN 1
- CT showing CT L-spine showing disc bulge from L2-5, with mild to moderate bilateral foraminal stenosis in L2-3 and moderate bilateral recess and foraminal stenosis from L3-5 due to subarticular disc protrusion in the setting of L>R hypertrophic facet arthropathy  - Start Tylenol 975mg TID + Robaxin 750mg TID + Lidocaine patch  - IVP Toradol 15mg q6hrs PRN moderate pain + IVP morphine 3mg q6hrs PRN for severe pain - CT showing CT L-spine showing disc bulge from L2-5, with mild to moderate bilateral foraminal stenosis in L2-3 and moderate bilateral recess and foraminal stenosis from L3-5 due to subarticular disc protrusion in the setting of L>R hypertrophic facet arthropathy  - Start Tylenol 975mg TID + Robaxin 750mg TID + Lidocaine patch  - IVP Toradol 15mg q6hrs PRN moderate pain + IVP morphine 3mg q6hrs PRN for severe pain  - PO Valium 5mg BID PRN for muscle spasms

## 2024-11-26 NOTE — ED PROVIDER NOTE - OBJECTIVE STATEMENT
59-year-old female past medical history of cervical radiculopathy, lumbar radiculopathy, sciatica presents to the ED for evaluation of back pain.  Patient states this recent flare-up of back pain started yesterday, very severe, radiating down left leg, 1 episode of urinary incontinence.  Patient also reports slight numbness/hypersensitivity in her thigh/perianal.  Denies any more events of incontinence, today had full bowel and bladder control.  Denies any fevers, dysuria.  Reports disturbances in gait secondary to pain, some tingling in

## 2024-11-26 NOTE — H&P ADULT - PROBLEM SELECTOR PLAN 2
- C/w levothyroxine 50mcg daily - Unclear reason why she is having symptoms, seems subjective as there is no sensory deficit on neuro exam, no pathology identified on CT Cervical spine  - Consider MR C-spine if symptoms persist

## 2024-11-26 NOTE — H&P ADULT - NSHPLABSRESULTS_GEN_ALL_CORE
14.0   10.01 )-----------( 254      ( 26 Nov 2024 17:06 )             40.8     11-26    139  |  98  |  15  ----------------------------<  72  3.4[L]   |  24  |  0.81    Ca    9.8      26 Nov 2024 17:06    TPro  7.7  /  Alb  4.5  /  TBili  1.0  /  DBili  x   /  AST  28  /  ALT  22  /  AlkPhos  84  11-26          LIVER FUNCTIONS - ( 26 Nov 2024 17:06 )  Alb: 4.5 g/dL / Pro: 7.7 g/dL / ALK PHOS: 84 U/L / ALT: 22 U/L / AST: 28 U/L / GGT: x             Urinalysis Basic - ( 26 Nov 2024 17:06 )    Color: x / Appearance: x / SG: x / pH: x  Gluc: 72 mg/dL / Ketone: x  / Bili: x / Urobili: x   Blood: x / Protein: x / Nitrite: x   Leuk Esterase: x / RBC: x / WBC x   Sq Epi: x / Non Sq Epi: x / Bacteria: x

## 2024-11-26 NOTE — H&P ADULT - NSHPPHYSICALEXAM_GEN_ALL_CORE
Vital Signs Last 24 Hrs  T(C): 36.7 (26 Nov 2024 19:44), Max: 36.7 (26 Nov 2024 15:45)  T(F): 98 (26 Nov 2024 19:44), Max: 98.1 (26 Nov 2024 15:45)  HR: 84 (26 Nov 2024 19:44) (84 - 98)  BP: 140/81 (26 Nov 2024 19:44) (133/84 - 140/81)  BP(mean): --  RR: 16 (26 Nov 2024 19:44) (16 - 20)  SpO2: 99% (26 Nov 2024 19:44) (98% - 99%)    Parameters below as of 26 Nov 2024 19:44  Patient On (Oxygen Delivery Method): room air        CONSTITUTIONAL: Well-groomed, in no apparent distress  EYES: No conjunctival or scleral injection, non-icteric;   NECK: Trachea midline without palpable neck mass  RESPIRATORY: Breathing comfortably;  lungs CTA without wheeze/rhonchi/rales  CARDIOVASCULAR: +S1S2, RRR, no M/G/R; no lower extremity edema  GASTROINTESTINAL: No palpable masses or tenderness, +BS throughout, no rebound/guarding  SKIN: No rashes or ulcers noted  NEUROLOGIC: Sensation intact in LEs b/l to light touch  PSYCHIATRIC: A+O x 3; mood and affect appropriate; appropriate insight and judgment Vital Signs Last 24 Hrs  T(C): 36.7 (26 Nov 2024 19:44), Max: 36.7 (26 Nov 2024 15:45)  T(F): 98 (26 Nov 2024 19:44), Max: 98.1 (26 Nov 2024 15:45)  HR: 84 (26 Nov 2024 19:44) (84 - 98)  BP: 140/81 (26 Nov 2024 19:44) (133/84 - 140/81)  BP(mean): --  RR: 16 (26 Nov 2024 19:44) (16 - 20)  SpO2: 99% (26 Nov 2024 19:44) (98% - 99%)    Parameters below as of 26 Nov 2024 19:44  Patient On (Oxygen Delivery Method): room air        CONSTITUTIONAL: Well-groomed, in no apparent distress  EYES: No conjunctival or scleral injection, non-icteric;   NECK: Trachea midline without palpable neck mass  RESPIRATORY: Breathing comfortably;  lungs CTA without wheeze/rhonchi/rales  CARDIOVASCULAR: +S1S2, RRR, no M/G/R; no lower extremity edema  GASTROINTESTINAL: No palpable masses or tenderness, +BS throughout, no rebound/guarding  SKIN: No rashes or ulcers noted  NEUROLOGIC: Sensation intact in LEs b/l to light touch, 5/5 strength on RUE/RLE/LUE, 4/5 strength LLE due to pain; SLR test negative  PSYCHIATRIC: A+O x 3; mood and affect appropriate; appropriate insight and judgment Vital Signs Last 24 Hrs  T(C): 36.7 (26 Nov 2024 19:44), Max: 36.7 (26 Nov 2024 15:45)  T(F): 98 (26 Nov 2024 19:44), Max: 98.1 (26 Nov 2024 15:45)  HR: 84 (26 Nov 2024 19:44) (84 - 98)  BP: 140/81 (26 Nov 2024 19:44) (133/84 - 140/81)  BP(mean): --  RR: 16 (26 Nov 2024 19:44) (16 - 20)  SpO2: 99% (26 Nov 2024 19:44) (98% - 99%)    Parameters below as of 26 Nov 2024 19:44  Patient On (Oxygen Delivery Method): room air        CONSTITUTIONAL: Well-groomed, in no apparent distress  EYES: No conjunctival or scleral injection, non-icteric;   NECK: Trachea midline without palpable neck mass  RESPIRATORY: Breathing comfortably;  lungs CTA without wheeze/rhonchi/rales  CARDIOVASCULAR: +S1S2, RRR, no M/G/R; no lower extremity edema  GASTROINTESTINAL: No palpable masses or tenderness, +BS throughout, no rebound/guarding  SKIN: No rashes or ulcers noted  NEUROLOGIC: CN 2-12 grossly intact, Sensation intact in LEs b/l to light touch, 5/5 strength on RUE/RLE/LUE, 4/5 strength LLE due to pain; SLR test negative  PSYCHIATRIC: A+O x 3; mood and affect appropriate; appropriate insight and judgment

## 2024-11-26 NOTE — ED PROVIDER NOTE - NEUROLOGICAL LEVEL OF CONSCIOUSNESS
Patient is alert and oriented x 3, 5 out of 5 muscle strength in upper extremities, lower extremities 5 out of 5 in right lower extremity, 4/5 left lower extremity secondary to pain.  5 out of 5 in both lower extremities on dorsiflexion and plantarflexion./alert

## 2024-11-26 NOTE — ED ADULT NURSE REASSESSMENT NOTE - NS ED NURSE REASSESS COMMENT FT1
Pt endorses no change to pain levels s/p [facility], MD Walker made aware, awaiting further RN interventions at this time.

## 2024-11-26 NOTE — ED ADULT TRIAGE NOTE - CHIEF COMPLAINT QUOTE
left lower back pain radiating down left leg hx sciatica, sent for MRI  urinary incontinence due to pain sent for r/o cauda equina

## 2024-11-26 NOTE — ED ADULT NURSE NOTE - OBJECTIVE STATEMENT
58 y/o Female presents to ED from home with c/o back pain. PMH of GERD, Asthma, hypothyroidism. Pt states she woke up in the middle of the night and felt excruciating sharp back pain that radiates down leg. Pt also endorsing general weakness in extremities and "pins and needle" feeling. Pt had one episode of urinary incontinence. States she went to  and was referred to ER for MRI. Denies SOB, CP, HA, N/V/D. Pt is A&Ox3, well appearing/ speaking full sentences without difficulty. Airway patent with spontaneous unlabored breathing, Safety maintained bed in lowest position and locked.
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
## 2024-11-26 NOTE — ED PROVIDER NOTE - PROGRESS NOTE DETAILS
DO Denise (PGY1): Received sign out, pending CT results.   Labs showed no leukocytosis or severe anemia. No evidence of severe electrolyte abnormalities, liver disease, or renal disorder. CT lumbar showed no acute fracture. Moderate left L4-L5 lateral recess and foraminal stenosis due to   subarticular disc protrusion in the setting of left greater than right hypertrophic facet arthropathy. DO Denise (PGY1): CT lumbar showed no acute fracture. Moderate left L4-L5 lateral recess and foraminal stenosis due to subarticular disc protrusion in the setting of left greater than right hypertrophic facet arthropathy.    On reassessment, patient still complains of pain. Discussed limitations of medications that can be given due to their allergies. Expressed understanding. On reassessment, the patient found mild improvement of her pain. DO Denise (PGY1): On reassessment, the patient found mild improvement of her pain.   Discussed admission for pain control vs discharge with prescription. Patient feels she more comfortable being admitted for pain control. DO Denise (PGY1): CT lumbar showed no acute fracture. Moderate left L4-L5 lateral recess and foraminal stenosis due to subarticular disc protrusion in the setting of left greater than right hypertrophic facet arthropathy.    On reassessment, patient still complains of pain. Discussed limitations of medications that can be given due to their allergies. Expressed understanding. Ordered Toradol IV and Robaxin PO.

## 2024-11-26 NOTE — H&P ADULT - ASSESSMENT
58 y/o female w/ PMHx cervical and lumbar radiculopathy, hypothyroidism, GERD, and migraines? who presents for left lower back pain with radiation to the LLE for 1 day causing inability to ambulate. CT L-spine showing moderate bilateral lateral recess and foraminal stenosis in L3-5. Admitted for pain control.  60 y/o female w/ PMHx cervical and lumbar radiculopathy, hypothyroidism, hiatal hernia, GERD, and diverticulosis who presents for left lower back pain with radiation to the LLE for 1 day causing inability to ambulate. CT L-spine showing moderate bilateral lateral recess and foraminal stenosis in L3-5. Admitted for pain control.
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
## 2024-11-27 ENCOUNTER — TRANSCRIPTION ENCOUNTER (OUTPATIENT)
Age: 59
End: 2024-11-27

## 2024-11-27 DIAGNOSIS — R20.0 ANESTHESIA OF SKIN: ICD-10-CM

## 2024-11-27 DIAGNOSIS — E03.9 HYPOTHYROIDISM, UNSPECIFIED: ICD-10-CM

## 2024-11-27 DIAGNOSIS — M54.16 RADICULOPATHY, LUMBAR REGION: ICD-10-CM

## 2024-11-27 DIAGNOSIS — Z29.9 ENCOUNTER FOR PROPHYLACTIC MEASURES, UNSPECIFIED: ICD-10-CM

## 2024-11-27 LAB
ALBUMIN SERPL ELPH-MCNC: 3.5 G/DL — SIGNIFICANT CHANGE UP (ref 3.3–5)
ALP SERPL-CCNC: 64 U/L — SIGNIFICANT CHANGE UP (ref 40–120)
ALT FLD-CCNC: 17 U/L — SIGNIFICANT CHANGE UP (ref 10–45)
ANION GAP SERPL CALC-SCNC: 12 MMOL/L — SIGNIFICANT CHANGE UP (ref 5–17)
APPEARANCE UR: CLEAR — SIGNIFICANT CHANGE UP
AST SERPL-CCNC: 18 U/L — SIGNIFICANT CHANGE UP (ref 10–40)
BILIRUB SERPL-MCNC: 0.7 MG/DL — SIGNIFICANT CHANGE UP (ref 0.2–1.2)
BILIRUB UR-MCNC: NEGATIVE — SIGNIFICANT CHANGE UP
BUN SERPL-MCNC: 22 MG/DL — SIGNIFICANT CHANGE UP (ref 7–23)
CALCIUM SERPL-MCNC: 9.2 MG/DL — SIGNIFICANT CHANGE UP (ref 8.4–10.5)
CHLORIDE SERPL-SCNC: 103 MMOL/L — SIGNIFICANT CHANGE UP (ref 96–108)
CO2 SERPL-SCNC: 24 MMOL/L — SIGNIFICANT CHANGE UP (ref 22–31)
COLOR SPEC: YELLOW — SIGNIFICANT CHANGE UP
CREAT SERPL-MCNC: 1.03 MG/DL — SIGNIFICANT CHANGE UP (ref 0.5–1.3)
DIFF PNL FLD: NEGATIVE — SIGNIFICANT CHANGE UP
EGFR: 63 ML/MIN/1.73M2 — SIGNIFICANT CHANGE UP
GLUCOSE SERPL-MCNC: 99 MG/DL — SIGNIFICANT CHANGE UP (ref 70–99)
GLUCOSE UR QL: NEGATIVE MG/DL — SIGNIFICANT CHANGE UP
HCT VFR BLD CALC: 34.7 % — SIGNIFICANT CHANGE UP (ref 34.5–45)
HGB BLD-MCNC: 12.1 G/DL — SIGNIFICANT CHANGE UP (ref 11.5–15.5)
KETONES UR-MCNC: ABNORMAL MG/DL
LEUKOCYTE ESTERASE UR-ACNC: NEGATIVE — SIGNIFICANT CHANGE UP
MCHC RBC-ENTMCNC: 34 PG — SIGNIFICANT CHANGE UP (ref 27–34)
MCHC RBC-ENTMCNC: 34.9 G/DL — SIGNIFICANT CHANGE UP (ref 32–36)
MCV RBC AUTO: 97.5 FL — SIGNIFICANT CHANGE UP (ref 80–100)
NITRITE UR-MCNC: NEGATIVE — SIGNIFICANT CHANGE UP
NRBC # BLD: 0 /100 WBCS — SIGNIFICANT CHANGE UP (ref 0–0)
PH UR: 6 — SIGNIFICANT CHANGE UP (ref 5–8)
PLATELET # BLD AUTO: 230 K/UL — SIGNIFICANT CHANGE UP (ref 150–400)
POTASSIUM SERPL-MCNC: 3.4 MMOL/L — LOW (ref 3.5–5.3)
POTASSIUM SERPL-SCNC: 3.4 MMOL/L — LOW (ref 3.5–5.3)
PROT SERPL-MCNC: 6.3 G/DL — SIGNIFICANT CHANGE UP (ref 6–8.3)
PROT UR-MCNC: NEGATIVE MG/DL — SIGNIFICANT CHANGE UP
RBC # BLD: 3.56 M/UL — LOW (ref 3.8–5.2)
RBC # FLD: 12.7 % — SIGNIFICANT CHANGE UP (ref 10.3–14.5)
SODIUM SERPL-SCNC: 139 MMOL/L — SIGNIFICANT CHANGE UP (ref 135–145)
SP GR SPEC: 1.02 — SIGNIFICANT CHANGE UP (ref 1–1.03)
UROBILINOGEN FLD QL: 0.2 MG/DL — SIGNIFICANT CHANGE UP (ref 0.2–1)
WBC # BLD: 6.42 K/UL — SIGNIFICANT CHANGE UP (ref 3.8–10.5)
WBC # FLD AUTO: 6.42 K/UL — SIGNIFICANT CHANGE UP (ref 3.8–10.5)

## 2024-11-27 PROCEDURE — 99233 SBSQ HOSP IP/OBS HIGH 50: CPT

## 2024-11-27 PROCEDURE — 72158 MRI LUMBAR SPINE W/O & W/DYE: CPT | Mod: 26

## 2024-11-27 RX ORDER — VALACYCLOVIR HYDROCHLORIDE 1 G/1
500 TABLET, FILM COATED ORAL DAILY
Refills: 0 | Status: DISCONTINUED | OUTPATIENT
Start: 2024-11-27 | End: 2024-11-29

## 2024-11-27 RX ORDER — MINOXIDIL 10 MG/1
0.5 TABLET ORAL
Refills: 0 | DISCHARGE

## 2024-11-27 RX ORDER — LIDOCAINE 40 MG/G
1 CREAM TOPICAL
Qty: 1 | Refills: 0
Start: 2024-11-27

## 2024-11-27 RX ORDER — MONTELUKAST SODIUM 10 MG/1
10 TABLET ORAL AT BEDTIME
Refills: 0 | Status: DISCONTINUED | OUTPATIENT
Start: 2024-11-27 | End: 2024-11-29

## 2024-11-27 RX ORDER — PANTOPRAZOLE SODIUM 40 MG/1
40 TABLET, DELAYED RELEASE ORAL
Refills: 0 | Status: DISCONTINUED | OUTPATIENT
Start: 2024-11-27 | End: 2024-11-29

## 2024-11-27 RX ORDER — PROGESTERONE 200 MG/1
100 CAPSULE ORAL AT BEDTIME
Refills: 0 | Status: DISCONTINUED | OUTPATIENT
Start: 2024-11-27 | End: 2024-11-29

## 2024-11-27 RX ORDER — METHOCARBAMOL 500 MG/1
750 TABLET, FILM COATED ORAL THREE TIMES A DAY
Refills: 0 | Status: DISCONTINUED | OUTPATIENT
Start: 2024-11-27 | End: 2024-11-29

## 2024-11-27 RX ORDER — METHOCARBAMOL 500 MG/1
1 TABLET, FILM COATED ORAL
Qty: 0 | Refills: 0 | DISCHARGE
Start: 2024-11-27

## 2024-11-27 RX ORDER — METHYLPREDNISOLONE SOD SUCC 125 MG
VIAL (EA) INJECTION
Refills: 0 | Status: DISCONTINUED | OUTPATIENT
Start: 2024-11-27 | End: 2024-11-29

## 2024-11-27 RX ORDER — METHYLPREDNISOLONE SOD SUCC 125 MG
4 VIAL (EA) INJECTION
Refills: 0 | Status: DISCONTINUED | OUTPATIENT
Start: 2024-11-28 | End: 2024-11-29

## 2024-11-27 RX ORDER — DIAZEPAM 10 MG/1
1 TABLET ORAL
Qty: 6 | Refills: 0
Start: 2024-11-27 | End: 2024-11-29

## 2024-11-27 RX ORDER — LEVOTHYROXINE SODIUM 150 MCG
50 TABLET ORAL DAILY
Refills: 0 | Status: DISCONTINUED | OUTPATIENT
Start: 2024-11-27 | End: 2024-11-29

## 2024-11-27 RX ORDER — ACETAMINOPHEN 500MG 500 MG/1
2 TABLET, COATED ORAL
Qty: 0 | Refills: 0 | DISCHARGE
Start: 2024-11-27

## 2024-11-27 RX ORDER — METHOCARBAMOL 500 MG/1
1 TABLET, FILM COATED ORAL
Qty: 15 | Refills: 0
Start: 2024-11-27 | End: 2024-12-01

## 2024-11-27 RX ORDER — METHYLPREDNISOLONE SOD SUCC 125 MG
4 VIAL (EA) INJECTION AT BEDTIME
Refills: 0 | Status: DISCONTINUED | OUTPATIENT
Start: 2024-11-29 | End: 2024-11-29

## 2024-11-27 RX ORDER — POTASSIUM CHLORIDE 600 MG/1
20 TABLET, EXTENDED RELEASE ORAL ONCE
Refills: 0 | Status: COMPLETED | OUTPATIENT
Start: 2024-11-27 | End: 2024-11-27

## 2024-11-27 RX ORDER — METHYLPREDNISOLONE SOD SUCC 125 MG
1 VIAL (EA) INJECTION
Qty: 1 | Refills: 0
Start: 2024-11-27

## 2024-11-27 RX ORDER — ENOXAPARIN SODIUM 30 MG/.3ML
40 INJECTION SUBCUTANEOUS EVERY 24 HOURS
Refills: 0 | Status: DISCONTINUED | OUTPATIENT
Start: 2024-11-27 | End: 2024-11-29

## 2024-11-27 RX ORDER — KETOROLAC TROMETHAMINE 30 MG/ML
15 INJECTION INTRAMUSCULAR; INTRAVENOUS EVERY 6 HOURS
Refills: 0 | Status: DISCONTINUED | OUTPATIENT
Start: 2024-11-27 | End: 2024-11-28

## 2024-11-27 RX ORDER — METHYLPREDNISOLONE SOD SUCC 125 MG
24 VIAL (EA) INJECTION ONCE
Refills: 0 | Status: COMPLETED | OUTPATIENT
Start: 2024-11-27 | End: 2024-11-27

## 2024-11-27 RX ORDER — DIAZEPAM 10 MG/1
5 TABLET ORAL
Refills: 0 | Status: DISCONTINUED | OUTPATIENT
Start: 2024-11-27 | End: 2024-11-29

## 2024-11-27 RX ORDER — LIDOCAINE 40 MG/G
1 CREAM TOPICAL DAILY
Refills: 0 | Status: DISCONTINUED | OUTPATIENT
Start: 2024-11-27 | End: 2024-11-29

## 2024-11-27 RX ORDER — CETIRIZINE HYDROCHLORIDE 10 MG/1
10 TABLET ORAL AT BEDTIME
Refills: 0 | Status: DISCONTINUED | OUTPATIENT
Start: 2024-11-27 | End: 2024-11-29

## 2024-11-27 RX ORDER — CETIRIZINE HYDROCHLORIDE 10 MG/1
1 TABLET ORAL
Qty: 0 | Refills: 0 | DISCHARGE
Start: 2024-11-27

## 2024-11-27 RX ORDER — ESTRADIOL 0.05MG/24H
1 PATCH, TRANSDERMAL SEMIWEEKLY TRANSDERMAL AT BEDTIME
Refills: 0 | Status: DISCONTINUED | OUTPATIENT
Start: 2024-11-27 | End: 2024-11-29

## 2024-11-27 RX ORDER — METHYLPREDNISOLONE SOD SUCC 125 MG
8 VIAL (EA) INJECTION AT BEDTIME
Refills: 0 | Status: COMPLETED | OUTPATIENT
Start: 2024-11-28 | End: 2024-11-28

## 2024-11-27 RX ORDER — DIAZEPAM 10 MG/1
1 TABLET ORAL
Qty: 0 | Refills: 0 | DISCHARGE
Start: 2024-11-27

## 2024-11-27 RX ORDER — ACETAMINOPHEN 500MG 500 MG/1
975 TABLET, COATED ORAL EVERY 8 HOURS
Refills: 0 | Status: DISCONTINUED | OUTPATIENT
Start: 2024-11-27 | End: 2024-11-28

## 2024-11-27 RX ORDER — SODIUM CHLORIDE 0.65 %
1 AEROSOL, SPRAY (ML) NASAL
Refills: 0 | Status: DISCONTINUED | OUTPATIENT
Start: 2024-11-27 | End: 2024-11-29

## 2024-11-27 RX ADMIN — DIAZEPAM 5 MILLIGRAM(S): 10 TABLET ORAL at 16:32

## 2024-11-27 RX ADMIN — METHOCARBAMOL 750 MILLIGRAM(S): 500 TABLET, FILM COATED ORAL at 21:35

## 2024-11-27 RX ADMIN — METHOCARBAMOL 750 MILLIGRAM(S): 500 TABLET, FILM COATED ORAL at 05:08

## 2024-11-27 RX ADMIN — Medication 3 MILLIGRAM(S): at 22:34

## 2024-11-27 RX ADMIN — LIDOCAINE 1 PATCH: 40 CREAM TOPICAL at 22:32

## 2024-11-27 RX ADMIN — PANTOPRAZOLE SODIUM 40 MILLIGRAM(S): 40 TABLET, DELAYED RELEASE ORAL at 16:14

## 2024-11-27 RX ADMIN — LIDOCAINE 1 PATCH: 40 CREAM TOPICAL at 11:18

## 2024-11-27 RX ADMIN — Medication 3 MILLIGRAM(S): at 21:34

## 2024-11-27 RX ADMIN — Medication 1 SPRAY(S): at 22:28

## 2024-11-27 RX ADMIN — ACETAMINOPHEN 500MG 975 MILLIGRAM(S): 500 TABLET, COATED ORAL at 21:36

## 2024-11-27 RX ADMIN — ACETAMINOPHEN 500MG 975 MILLIGRAM(S): 500 TABLET, COATED ORAL at 13:17

## 2024-11-27 RX ADMIN — CETIRIZINE HYDROCHLORIDE 10 MILLIGRAM(S): 10 TABLET ORAL at 21:35

## 2024-11-27 RX ADMIN — ENOXAPARIN SODIUM 40 MILLIGRAM(S): 30 INJECTION SUBCUTANEOUS at 13:17

## 2024-11-27 RX ADMIN — KETOROLAC TROMETHAMINE 15 MILLIGRAM(S): 30 INJECTION INTRAMUSCULAR; INTRAVENOUS at 12:14

## 2024-11-27 RX ADMIN — KETOROLAC TROMETHAMINE 15 MILLIGRAM(S): 30 INJECTION INTRAMUSCULAR; INTRAVENOUS at 06:00

## 2024-11-27 RX ADMIN — ACETAMINOPHEN 500MG 975 MILLIGRAM(S): 500 TABLET, COATED ORAL at 05:08

## 2024-11-27 RX ADMIN — METHOCARBAMOL 750 MILLIGRAM(S): 500 TABLET, FILM COATED ORAL at 13:17

## 2024-11-27 RX ADMIN — MONTELUKAST SODIUM 10 MILLIGRAM(S): 10 TABLET ORAL at 21:36

## 2024-11-27 RX ADMIN — PROGESTERONE 100 MILLIGRAM(S): 200 CAPSULE ORAL at 21:35

## 2024-11-27 RX ADMIN — ACETAMINOPHEN 500MG 975 MILLIGRAM(S): 500 TABLET, COATED ORAL at 22:32

## 2024-11-27 RX ADMIN — VALACYCLOVIR HYDROCHLORIDE 500 MILLIGRAM(S): 1 TABLET, FILM COATED ORAL at 11:17

## 2024-11-27 RX ADMIN — Medication 1 MILLIGRAM(S): at 21:36

## 2024-11-27 RX ADMIN — KETOROLAC TROMETHAMINE 15 MILLIGRAM(S): 30 INJECTION INTRAMUSCULAR; INTRAVENOUS at 11:18

## 2024-11-27 RX ADMIN — Medication 50 MICROGRAM(S): at 05:08

## 2024-11-27 RX ADMIN — KETOROLAC TROMETHAMINE 15 MILLIGRAM(S): 30 INJECTION INTRAMUSCULAR; INTRAVENOUS at 00:24

## 2024-11-27 RX ADMIN — ACETAMINOPHEN 500MG 975 MILLIGRAM(S): 500 TABLET, COATED ORAL at 06:10

## 2024-11-27 RX ADMIN — ACETAMINOPHEN 500MG 975 MILLIGRAM(S): 500 TABLET, COATED ORAL at 14:20

## 2024-11-27 RX ADMIN — POTASSIUM CHLORIDE 20 MILLIEQUIVALENT(S): 600 TABLET, EXTENDED RELEASE ORAL at 13:17

## 2024-11-27 RX ADMIN — Medication 24 MILLIGRAM(S): at 13:10

## 2024-11-27 RX ADMIN — KETOROLAC TROMETHAMINE 15 MILLIGRAM(S): 30 INJECTION INTRAMUSCULAR; INTRAVENOUS at 05:12

## 2024-11-27 NOTE — DISCHARGE NOTE PROVIDER - CARE PROVIDERS DIRECT ADDRESSES
,[email or web address]

## 2024-11-27 NOTE — DISCHARGE NOTE PROVIDER - ATTENDING DISCHARGE PHYSICAL EXAMINATION:
Vital Signs Last 24 Hrs  T(C): 37.1 (29 Nov 2024 11:31), Max: 37.1 (29 Nov 2024 11:31)  T(F): 98.7 (29 Nov 2024 11:31), Max: 98.7 (29 Nov 2024 11:31)  HR: 100 (29 Nov 2024 11:31) (65 - 100)  BP: 115/77 (29 Nov 2024 11:31) (112/78 - 118/79)  BP(mean): --  RR: 18 (29 Nov 2024 11:31) (18 - 18)  SpO2: 95% (29 Nov 2024 11:31) (95% - 98%)    Parameters below as of 29 Nov 2024 11:31  Patient On (Oxygen Delivery Method): room air        CONSTITUTIONAL: Well-groomed, in no apparent distress  NECK: Trachea midline without palpable neck mass; thyroid not enlarged and non-tender  RESPIRATORY: Breathing comfortably; no dullness to percussion; lungs CTA without wheeze/rhonchi/rales  CARDIOVASCULAR: +S1S2, RRR  GASTROINTESTINAL: No palpable masses or tenderness, +BS throughout  PSYCHIATRIC: A+O x 3

## 2024-11-27 NOTE — DISCHARGE NOTE PROVIDER - NSDCCPCAREPLAN_GEN_ALL_CORE_FT
PRINCIPAL DISCHARGE DIAGNOSIS  Diagnosis: Lumbar radiculopathy  Assessment and Plan of Treatment: CT L-spine showing disc bulge from L2-5, with mild to moderate bilateral foraminal stenosis in L2-3 and moderate bilateral recess and foraminal stenosis from L3-5 due to subarticular disc protrusion  Pain control as needed  Medrol dose pack  Obtain MRI LS spine as outpatient   Follow up with Neurosurgery Dr. Worley as outpatient     PRINCIPAL DISCHARGE DIAGNOSIS  Diagnosis: Lumbar radiculopathy  Assessment and Plan of Treatment: CT L-spine showing disc bulge from L2-5, with mild to moderate bilateral foraminal stenosis in L2-3 and moderate bilateral recess and foraminal stenosis from L3-5 due to subarticular disc protrusion  Pain control as needed  Medrol dose pack  MRI LS spine   Follow up with Neurosurgery Dr. Worley as outpatient     PRINCIPAL DISCHARGE DIAGNOSIS  Diagnosis: Lumbar radiculopathy  Assessment and Plan of Treatment: CT L-spine showing disc bulge from L2-5, with mild to moderate bilateral foraminal stenosis in L2-3 and moderate bilateral recess and foraminal stenosis from L3-5 due to subarticular disc protrusion  Pain control as needed  Medrol dose pack  MRI LS spine   Follow up with Neurosurgery Dr. Worley as outpatient  Participate in physical therapy  Please follow-up with your primary care physician within one week of discharge.     PRINCIPAL DISCHARGE DIAGNOSIS  Diagnosis: Lumbar radiculopathy  Assessment and Plan of Treatment: CT L-spine showing disc bulge from L2-5, with mild to moderate bilateral foraminal stenosis in L2-3 and moderate bilateral recess and foraminal stenosis from L3-5 due to subarticular disc protrusion  Pain control as needed  Medrol dose pack  MRI LS spine   Stop gabapentin for discharge secondary to drowsiness. If you are still drowsy at home, you should stop amitriptyline.  Follow up with Neurosurgery Dr. Worley as outpatient and your neurologist.  Participate in physical therapy  Please follow-up with your primary care physician within one week of discharge.

## 2024-11-27 NOTE — DISCHARGE NOTE PROVIDER - CARE PROVIDER_API CALL
Carlos Worley Silver  Neurosurgery  [address], [address]  [place] [address]  Phone: [phone]  Fax: [phone]  Follow Up Time: 2 weeks   Carols Worley [facility]  Neurosurgery  [address], [address]  [place] [address]  Phone: [phone]  Fax: [phone]  Follow Up Time: 2 weeks    NANCI CHRISTIANSEN, KOJO FLORENTINO  Phone: [phone]  Fax: [phone]  Established Patient  Follow Up Time: 1 week

## 2024-11-27 NOTE — DISCHARGE NOTE PROVIDER - PROVIDER TOKENS
PROVIDER:[TOKEN:[[id number]],FOLLOWUP:[2 weeks]] PROVIDER:[TOKEN:[[id number]],FOLLOWUP:[2 weeks]],PROVIDER:[TOKEN:[[id number]],FOLLOWUP:[1 week],ESTABLISHEDPATIENT:[T]]
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
## 2024-11-27 NOTE — CONSULT NOTE ADULT - SUBJECTIVE AND OBJECTIVE BOX
Christie Kiser  HPI: 59F h/o chronic LBP, L4 radic p/w worsening back & L4 radicular pain, a/w mildly decreased sensation in L4 dermatome. No other numbness, weakness, b/b sx. Had b/l hand tingling now resolved. CT C spine normal. CT L spine w/ L4/5 disc, mod L>R foraminal stenosis. Exam: Awake, Ox3, BUE 5/5, BLE 5/5. Mildly decreased sensation Lt L4 dermatome otherwise SILT.  
p (1480)     HPI: 59F h/o chronic LBP, L4 radic p/w worsening back & LLE pain. Pain rad back to foot in ~L4 dermatome, similar to baseline but worse a/w mildly decreased sensation in same areas. Denies other numbness, no weakness, b/b sx. Had b/l hand tingling now resolved. CT C spine normal. CT L spine w/ L4/5 disc, mild foraminal stenosis.    Exam: Awake, Ox3, BUE 5/5, BLE 5/5. Mildly decreased sensation Lt L4 dermatome otherwise SILT.    =====================  PAST MEDICAL HISTORY   Asthma    GERD (Gastroesophageal Reflux Disease)    Lumbar Radiculopathy    Cervical Radiculopathy    Hypothyroidism      PAST SURGICAL HISTORY   S/P Arthroscopic Knee Surgery    History of Right Breast Implant    H/O nasal septoplasty    H/O hand surgery      hydrocodone (Hives)  tixocortol (Rash)  sulfa drugs (Rash)  bacitracin topical (Rash)  Triple Antibiotic First Aid (Rash)  Mayonnaise (Stomach Upset)  Bell Peppers (Stomach Upset)  iodopropynyl butylcarbamate (Rash)  corticosteroids (Rash)  codeine (Vomiting; Hives)  garlic (Stomach Upset)  adhesives (Rash)  Soy (Stomach Upset)  Polysporin Ointment (Rash)  Celebrex (Rash)  dairy products (Stomach Upset)  penicillin (Rash)  lactose (Stomach Upset)  Onions (Stomach Upset)      MEDICATIONS:  Antibiotics:  valACYclovir 500 milliGRAM(s) Oral daily    Neuro:  acetaminophen     Tablet .. 975 milliGRAM(s) Oral every 8 hours  diazepam    Tablet 5 milliGRAM(s) Oral two times a day PRN  ketorolac   Injectable 15 milliGRAM(s) IV Push every 6 hours PRN  melatonin 3 milliGRAM(s) Oral at bedtime PRN  methocarbamol 750 milliGRAM(s) Oral three times a day  morphine  - Injectable 3 milliGRAM(s) IV Push every 4 hours PRN    Other:  cetirizine 10 milliGRAM(s) Oral at bedtime  estradiol 1 milliGRAM(s) Oral at bedtime  levothyroxine 50 MICROGram(s) Oral daily  methylPREDNISolone   Oral   montelukast 10 milliGRAM(s) Oral at bedtime  potassium chloride    Tablet ER 20 milliEquivalent(s) Oral once  progesterone 100 milliGRAM(s) Oral at bedtime      SOCIAL HISTORY:   Occupation:   Marital Status:     FAMILY HISTORY:  No pertinent family history in first degree relatives        ROS: Negative except per HPI    LABS:                          12.1   6.42  )-----------( 230      ( 27 Nov 2024 06:41 )             34.7     11-27    139  |  103  |  22  ----------------------------<  99  3.4[L]   |  24  |  1.03    Ca    9.2      27 Nov 2024 06:41    TPro  6.3  /  Alb  3.5  /  TBili  0.7  /  DBili  x   /  AST  18  /  ALT  17  /  AlkPhos  64  11-27

## 2024-11-27 NOTE — DISCHARGE NOTE PROVIDER - NSDCFUADDAPPT_GEN_ALL_CORE_FT
APPTS ARE READY TO BE MADE: [ ] YES    Best Family or Patient Contact (if needed):    Additional Information about above appointments (if needed):    1:   2:   3:     Other comments or requests:    APPTS ARE READY TO BE MADE: [X] YES    Best Family or Patient Contact (if needed):    Additional Information about above appointments (if needed):    1:   2:   3:     Other comments or requests:    APPTS ARE READY TO BE MADE: [X] YES    Best Family or Patient Contact (if needed):    Additional Information about above appointments (if needed):    1: Neurosurgery  2: PCP  3:     Other comments or requests:    APPTS ARE READY TO BE MADE: [X] YES    Best Family or Patient Contact (if needed):    Additional Information about above appointments (if needed):    1: Neurosurgery  2: PCP  3:     Other comments or requests:   Patient informed us they already have secured a follow up appointment which is not visible on [facility] with      neurosurg-Appointment was scheduled but is not visible on [facility] on 12/18 at 130pm with dr. regalado at [address] [address], [place] [address] [phone] APPTS ARE READY TO BE MADE: [X] YES    Best Family or Patient Contact (if needed):    Additional Information about above appointments (if needed):    1: Neurosurgery  2: PCP  3:     Other comments or requests:   Patient informed us they already have secured a follow up appointment which is not visible on [facility] with      Appointment was scheduled by our team on the patient's behalf through the provider's office. 12/31 2:45p with Dr. Worley at [address] [facility], [address] [address] [phone]

## 2024-11-27 NOTE — DISCHARGE NOTE PROVIDER - NSDCCPTREATMENT_GEN_ALL_CORE_FT
PRINCIPAL PROCEDURE  Procedure: MRI lumbar spine wo contrast  Findings and Treatment: Mild multilevel degenerative changes.  Mild multilevel spinal canal stenosis and neural foraminal narrowing.  No abnormal enhancement.

## 2024-11-27 NOTE — DISCHARGE NOTE PROVIDER - NSDCFUSCHEDAPPT_GEN_ALL_CORE_FT
Carlos Worley  [facility] Physician [facility]  NEUROSURG [address] [place] [address]  Scheduled Appointment: 12/31/2024

## 2024-11-27 NOTE — CONSULT NOTE ADULT - ASSESSMENT
-no acute neurosurgical intervention  -PT, pain control per primary  -ok to obtain MR C/L spine as outpatient, not required this admission  -No neurosurgical contraindication to discharge when medically stable  -f/u outpatient 1-2 weeks with Dr. Worley
-no acute neurosurgical intervention  -f/u outpatient 1-2 weeks with Dr. Worley

## 2024-11-27 NOTE — DISCHARGE NOTE PROVIDER - NSDCMRMEDTOKEN_GEN_ALL_CORE_FT
cetirizine 10 mg oral tablet: 1 tab(s) orally once a day (at bedtime)  estradiol 1 mg oral tablet: 1 tab(s) orally once a day (at bedtime)  minoxidil 2.5 mg oral tablet: 0.5 tab(s) orally once a day  progesterone 100 mg oral capsule: 1 cap(s) orally once a day (at bedtime)  Singulair 10 mg oral tablet: 1 tab(s) orally once a day  Synthroid 50 mcg (0.05 mg) oral tablet: 1 tab(s) orally once a day  valACYclovir 500 mg oral tablet: 1 tab(s) orally once a day   acetaminophen 325 mg oral tablet: 2 tab(s) orally every 8 hours as needed for  mild pain  cetirizine 10 mg oral tablet: 1 tab(s) orally once a day (at bedtime)  diazePAM 5 mg oral tablet: 1 tab(s) orally 2 times a day as needed for muscle spasms MDD: 2  estradiol 1 mg oral tablet: 1 tab(s) orally once a day (at bedtime)  lidocaine 4% topical film: Apply topically to affected area once a day  Medrol Dosepak 4 mg oral tablet: 1 tab(s) orally once a day use as directed  methocarbamol 750 mg oral tablet: 1 tab(s) orally 3 times a day as needed for  moderate pain  minoxidil 2.5 mg oral tablet: 0.5 tab(s) orally once a day  progesterone 100 mg oral capsule: 1 cap(s) orally once a day (at bedtime)  Singulair 10 mg oral tablet: 1 tab(s) orally once a day  Synthroid 50 mcg (0.05 mg) oral tablet: 1 tab(s) orally once a day  valACYclovir 500 mg oral tablet: 1 tab(s) orally once a day   acetaminophen 325 mg oral tablet: 2 tab(s) orally every 6 hours  amitriptyline 10 mg oral tablet: 3 tab(s) orally once a day (at bedtime)  cetirizine 10 mg oral tablet: 1 tab(s) orally once a day (at bedtime)  diazePAM 5 mg oral tablet: 1 tab(s) orally 2 times a day as needed for muscle spasms MDD: 2  estradiol 1 mg oral tablet: 1 tab(s) orally once a day (at bedtime)  ibuprofen 400 mg oral tablet: 1 tab(s) orally every 6 hours as needed for  moderate pain  lidocaine 4% topical film: Apply topically to affected area once a day  Medrol Dosepak 4 mg oral tablet: 1 tab(s) orally once a day use as directed  methocarbamol 750 mg oral tablet: 1 tab(s) orally 3 times a day as needed for  moderate pain  minoxidil 2.5 mg oral tablet: 0.5 tab(s) orally once a day  Narcan 4 mg/0.1 mL nasal spray: 1 spray(s) intranasally 3 times a day as needed for opioid overdose as instructed by pharmacy staff  oxyCODONE 5 mg oral tablet: 1 tab(s) orally every 4 hours as needed for Severe Pain (7 - 10) MDD: 6 tabs  progesterone 100 mg oral capsule: 1 cap(s) orally once a day (at bedtime)  Singulair 10 mg oral tablet: 1 tab(s) orally once a day  Synthroid 50 mcg (0.05 mg) oral tablet: 1 tab(s) orally once a day  valACYclovir 500 mg oral tablet: 1 tab(s) orally once a day   acetaminophen 325 mg oral tablet: 2 tab(s) orally every 6 hours  amitriptyline 10 mg oral tablet: 3 tab(s) orally once a day (at bedtime)  cetirizine 10 mg oral tablet: 1 tab(s) orally once a day (at bedtime)  diazePAM 5 mg oral tablet: 1 tab(s) orally 2 times a day as needed for muscle spasms MDD: 2  estradiol 1 mg oral tablet: 1 tab(s) orally once a day (at bedtime)  ibuprofen 400 mg oral tablet: 1 tab(s) orally every 6 hours as needed for  moderate pain  lidocaine 4% topical film: Apply topically to affected area once a day  Medrol Dosepak 4 mg oral tablet: 1 tab(s) orally once a day use as directed  methocarbamol 750 mg oral tablet: 1 tab(s) orally 3 times a day as needed for  moderate pain  minoxidil 2.5 mg oral tablet: 0.5 tab(s) orally once a day  Narcan 4 mg/0.1 mL nasal spray: 1 spray(s) intranasally 3 times a day as needed for opioid overdose as instructed by pharmacy staff  oxyCODONE 5 mg oral tablet: 1 tab(s) orally every 4 hours as needed for Severe Pain (7 - 10) MDD: 6 tabs  physical therapy: evaluate and treat  progesterone 100 mg oral capsule: 1 cap(s) orally once a day (at bedtime)  Singulair 10 mg oral tablet: 1 tab(s) orally once a day  Synthroid 50 mcg (0.05 mg) oral tablet: 1 tab(s) orally once a day  valACYclovir 500 mg oral tablet: 1 tab(s) orally once a day

## 2024-11-28 LAB
CULTURE RESULTS: SIGNIFICANT CHANGE UP
SPECIMEN SOURCE: SIGNIFICANT CHANGE UP

## 2024-11-28 PROCEDURE — 99232 SBSQ HOSP IP/OBS MODERATE 35: CPT

## 2024-11-28 RX ORDER — GABAPENTIN 300 MG/1
100 CAPSULE ORAL THREE TIMES A DAY
Refills: 0 | Status: DISCONTINUED | OUTPATIENT
Start: 2024-11-28 | End: 2024-11-29

## 2024-11-28 RX ORDER — SIMETHICONE 125 MG
80 CAPSULE ORAL ONCE
Refills: 0 | Status: DISCONTINUED | OUTPATIENT
Start: 2024-11-28 | End: 2024-11-29

## 2024-11-28 RX ORDER — AMITRIPTYLINE HYDROCHLORIDE 50 MG/1
30 TABLET ORAL AT BEDTIME
Refills: 0 | Status: DISCONTINUED | OUTPATIENT
Start: 2024-11-28 | End: 2024-11-29

## 2024-11-28 RX ORDER — ACETAMINOPHEN 500MG 500 MG/1
650 TABLET, COATED ORAL EVERY 6 HOURS
Refills: 0 | Status: DISCONTINUED | OUTPATIENT
Start: 2024-11-28 | End: 2024-11-29

## 2024-11-28 RX ORDER — OXYCODONE HYDROCHLORIDE 30 MG/1
5 TABLET ORAL EVERY 4 HOURS
Refills: 0 | Status: DISCONTINUED | OUTPATIENT
Start: 2024-11-28 | End: 2024-11-29

## 2024-11-28 RX ORDER — IBUPROFEN 200 MG
400 TABLET ORAL EVERY 6 HOURS
Refills: 0 | Status: DISCONTINUED | OUTPATIENT
Start: 2024-11-28 | End: 2024-11-29

## 2024-11-28 RX ORDER — BUTALB/ACETAMINOPHEN/CAFFEINE 50-325-40
1 TABLET ORAL ONCE
Refills: 0 | Status: DISCONTINUED | OUTPATIENT
Start: 2024-11-28 | End: 2024-11-29

## 2024-11-28 RX ADMIN — LIDOCAINE 1 PATCH: 40 CREAM TOPICAL at 20:00

## 2024-11-28 RX ADMIN — Medication 3 MILLIGRAM(S): at 02:42

## 2024-11-28 RX ADMIN — Medication 50 MICROGRAM(S): at 05:26

## 2024-11-28 RX ADMIN — ACETAMINOPHEN 500MG 650 MILLIGRAM(S): 500 TABLET, COATED ORAL at 17:47

## 2024-11-28 RX ADMIN — PANTOPRAZOLE SODIUM 40 MILLIGRAM(S): 40 TABLET, DELAYED RELEASE ORAL at 05:26

## 2024-11-28 RX ADMIN — LIDOCAINE 1 PATCH: 40 CREAM TOPICAL at 11:12

## 2024-11-28 RX ADMIN — Medication 4 MILLIGRAM(S): at 17:47

## 2024-11-28 RX ADMIN — AMITRIPTYLINE HYDROCHLORIDE 30 MILLIGRAM(S): 50 TABLET ORAL at 21:39

## 2024-11-28 RX ADMIN — OXYCODONE HYDROCHLORIDE 5 MILLIGRAM(S): 30 TABLET ORAL at 21:40

## 2024-11-28 RX ADMIN — GABAPENTIN 100 MILLIGRAM(S): 300 CAPSULE ORAL at 21:39

## 2024-11-28 RX ADMIN — Medication 4 MILLIGRAM(S): at 05:26

## 2024-11-28 RX ADMIN — OXYCODONE HYDROCHLORIDE 5 MILLIGRAM(S): 30 TABLET ORAL at 13:54

## 2024-11-28 RX ADMIN — ENOXAPARIN SODIUM 40 MILLIGRAM(S): 30 INJECTION SUBCUTANEOUS at 12:47

## 2024-11-28 RX ADMIN — DIAZEPAM 5 MILLIGRAM(S): 10 TABLET ORAL at 00:21

## 2024-11-28 RX ADMIN — VALACYCLOVIR HYDROCHLORIDE 500 MILLIGRAM(S): 1 TABLET, FILM COATED ORAL at 11:12

## 2024-11-28 RX ADMIN — GABAPENTIN 100 MILLIGRAM(S): 300 CAPSULE ORAL at 14:51

## 2024-11-28 RX ADMIN — Medication 4 MILLIGRAM(S): at 12:48

## 2024-11-28 RX ADMIN — Medication 3 MILLIGRAM(S): at 08:10

## 2024-11-28 RX ADMIN — Medication 1 MILLIGRAM(S): at 21:40

## 2024-11-28 RX ADMIN — METHOCARBAMOL 750 MILLIGRAM(S): 500 TABLET, FILM COATED ORAL at 05:26

## 2024-11-28 RX ADMIN — ACETAMINOPHEN 500MG 975 MILLIGRAM(S): 500 TABLET, COATED ORAL at 05:43

## 2024-11-28 RX ADMIN — Medication 3 MILLIGRAM(S): at 09:10

## 2024-11-28 RX ADMIN — PROGESTERONE 100 MILLIGRAM(S): 200 CAPSULE ORAL at 21:39

## 2024-11-28 RX ADMIN — Medication 8 MILLIGRAM(S): at 21:39

## 2024-11-28 RX ADMIN — ACETAMINOPHEN 500MG 650 MILLIGRAM(S): 500 TABLET, COATED ORAL at 18:44

## 2024-11-28 RX ADMIN — ACETAMINOPHEN 500MG 975 MILLIGRAM(S): 500 TABLET, COATED ORAL at 05:26

## 2024-11-28 RX ADMIN — OXYCODONE HYDROCHLORIDE 5 MILLIGRAM(S): 30 TABLET ORAL at 12:54

## 2024-11-28 RX ADMIN — CETIRIZINE HYDROCHLORIDE 10 MILLIGRAM(S): 10 TABLET ORAL at 21:39

## 2024-11-28 RX ADMIN — OXYCODONE HYDROCHLORIDE 5 MILLIGRAM(S): 30 TABLET ORAL at 22:40

## 2024-11-28 RX ADMIN — METHOCARBAMOL 750 MILLIGRAM(S): 500 TABLET, FILM COATED ORAL at 14:51

## 2024-11-28 RX ADMIN — LIDOCAINE 1 PATCH: 40 CREAM TOPICAL at 23:19

## 2024-11-28 RX ADMIN — METHOCARBAMOL 750 MILLIGRAM(S): 500 TABLET, FILM COATED ORAL at 21:39

## 2024-11-28 RX ADMIN — Medication 3 MILLIGRAM(S): at 01:41

## 2024-11-28 RX ADMIN — MONTELUKAST SODIUM 10 MILLIGRAM(S): 10 TABLET ORAL at 21:39

## 2024-11-28 RX ADMIN — ACETAMINOPHEN 500MG 650 MILLIGRAM(S): 500 TABLET, COATED ORAL at 23:02

## 2024-11-28 NOTE — PROGRESS NOTE ADULT - TIME BILLING
Time spent on review of vitals, physical exam, documentation, and discussion of plan of care with patient, patient family, resident, consultants and multidisciplinary team.
Time-based billing (NON-critical care).     The necessity of the time spent during the encounter on this date of service was due to:     - Ordering, reviewing, and interpreting labs, testing, and imaging.  - Independently obtaining a review of systems and performing a physical exam  - Reviewing prior hospitalization and where necessary, outpatient records.  - Counselling and educating patient and family regarding interpretation of aforementioned items and plan of care.

## 2024-11-28 NOTE — PROGRESS NOTE ADULT - ASSESSMENT
60 y/o female w/ PMHx cervical and lumbar radiculopathy, hypothyroidism, hiatal hernia, GERD, and diverticulosis who presents for left lower back pain with radiation to the LLE for 1 day causing inability to ambulate. CT L-spine showing moderate bilateral lateral recess and foraminal stenosis in L3-5. Admitted for pain control. 
60 y/o female w/ PMHx cervical and lumbar radiculopathy, hypothyroidism, hiatal hernia, GERD, and diverticulosis who presents for left lower back pain with radiation to the LLE for 1 day causing inability to ambulate. CT L-spine showing moderate bilateral lateral recess and foraminal stenosis in L3-5. Admitted for pain control.
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
## 2024-11-28 NOTE — PROGRESS NOTE ADULT - PROBLEM SELECTOR PLAN 1
- CT showing CT L-spine showing disc bulge from L2-5, with mild to moderate bilateral foraminal stenosis in L2-3 and moderate bilateral recess and foraminal stenosis from L3-5 due to subarticular disc protrusion in the setting of L>R hypertrophic facet arthropathy. MR L spine with mild multilevel degenerative changes and Mild multilevel spinal canal stenosis and neural foraminal narrowing.  - continue Robaxin 750mg TID + Lidocaine patch  - switch IV pain regimen to PO and monitor for response. Added gabapentin 100 TID, ibuprofen and tylenol ATC q6h. Oxycodone 5 PRN for severe pain.  - PO Valium 5mg BID PRN for muscle spasms  - c/w Medrol dose pack  - NSGY consulted, outpatient follow up
- CT showing CT L-spine showing disc bulge from L2-5, with mild to moderate bilateral foraminal stenosis in L2-3 and moderate bilateral recess and foraminal stenosis from L3-5 due to subarticular disc protrusion in the setting of L>R hypertrophic facet arthropathy  - continue Tylenol 975mg TID + Robaxin 750mg TID + Lidocaine patch  - continue IVP Toradol 15mg q6hrs PRN moderate pain + IVP morphine 3mg q6hrs PRN for severe pain  - PO Valium 5mg BID PRN for muscle spasms  Start Medrol dose pack  Obtain MRI LS spine with IV contrast  NSG consulted, follow up recommendations

## 2024-11-28 NOTE — PROGRESS NOTE ADULT - PROBLEM SELECTOR PLAN 4
DVT PPx: Lovenox  Continue home estradiol and progesterone    Dispo: if MRI nonacute then plan for dc today    d/w pt in detail
DVT PPx: Lovenox  Continue home estradiol and progesterone and amitriptyline 30 mg qhs    Dispo: likely d/c 11/29 if tolerates oral pain regimen    d/w pt in detail

## 2024-11-28 NOTE — PROGRESS NOTE ADULT - PROBLEM SELECTOR PLAN 2
Linette Sewell     Adjusted Ideal        Petr DOUGLAS [facility]  12/1/2017 3:42 PM  Contact # [phone]  
- Unclear reason why she is having symptoms, seems subjective as there is no sensory deficit on neuro exam, no pathology identified on CT Cervical spine  - outpatient MRI c-spine.
- Unclear reason why she is having symptoms, seems subjective as there is no sensory deficit on neuro exam, no pathology identified on CT Cervical spine  - Consider MR C-spine if symptoms persist

## 2024-11-29 ENCOUNTER — TRANSCRIPTION ENCOUNTER (OUTPATIENT)
Age: 59
End: 2024-11-29

## 2024-11-29 VITALS
RESPIRATION RATE: 18 BRPM | DIASTOLIC BLOOD PRESSURE: 77 MMHG | SYSTOLIC BLOOD PRESSURE: 115 MMHG | OXYGEN SATURATION: 95 % | TEMPERATURE: 99 F | HEART RATE: 100 BPM

## 2024-11-29 LAB
FLUAV AG NPH QL: SIGNIFICANT CHANGE UP
FLUBV AG NPH QL: SIGNIFICANT CHANGE UP
RSV RNA NPH QL NAA+NON-PROBE: SIGNIFICANT CHANGE UP
SARS-COV-2 RNA SPEC QL NAA+PROBE: SIGNIFICANT CHANGE UP

## 2024-11-29 PROCEDURE — 87086 URINE CULTURE/COLONY COUNT: CPT

## 2024-11-29 PROCEDURE — 99239 HOSP IP/OBS DSCHRG MGMT >30: CPT

## 2024-11-29 PROCEDURE — A9585: CPT

## 2024-11-29 PROCEDURE — 85025 COMPLETE CBC W/AUTO DIFF WBC: CPT

## 2024-11-29 PROCEDURE — 80053 COMPREHEN METABOLIC PANEL: CPT

## 2024-11-29 PROCEDURE — 96375 TX/PRO/DX INJ NEW DRUG ADDON: CPT

## 2024-11-29 PROCEDURE — 72131 CT LUMBAR SPINE W/O DYE: CPT | Mod: MC

## 2024-11-29 PROCEDURE — 99285 EMERGENCY DEPT VISIT HI MDM: CPT | Mod: 25

## 2024-11-29 PROCEDURE — 81003 URINALYSIS AUTO W/O SCOPE: CPT

## 2024-11-29 PROCEDURE — 96374 THER/PROPH/DIAG INJ IV PUSH: CPT

## 2024-11-29 PROCEDURE — 36415 COLL VENOUS BLD VENIPUNCTURE: CPT

## 2024-11-29 PROCEDURE — 85027 COMPLETE CBC AUTOMATED: CPT

## 2024-11-29 PROCEDURE — 97162 PT EVAL MOD COMPLEX 30 MIN: CPT

## 2024-11-29 PROCEDURE — 72125 CT NECK SPINE W/O DYE: CPT | Mod: MC

## 2024-11-29 PROCEDURE — 72158 MRI LUMBAR SPINE W/O & W/DYE: CPT | Mod: MC

## 2024-11-29 PROCEDURE — 87637 SARSCOV2&INF A&B&RSV AMP PRB: CPT

## 2024-11-29 RX ORDER — NALOXONE HCL 0.4 MG/ML
1 AMPUL (ML) INJECTION
Qty: 1 | Refills: 0
Start: 2024-11-29 | End: 2024-11-29

## 2024-11-29 RX ORDER — IBUPROFEN 200 MG
1 TABLET ORAL
Qty: 56 | Refills: 0
Start: 2024-11-29 | End: 2024-12-12

## 2024-11-29 RX ORDER — OXYCODONE HYDROCHLORIDE 30 MG/1
1 TABLET ORAL
Qty: 30 | Refills: 0
Start: 2024-11-29 | End: 2024-12-03

## 2024-11-29 RX ORDER — AMITRIPTYLINE HYDROCHLORIDE 50 MG/1
3 TABLET ORAL
Qty: 42 | Refills: 0
Start: 2024-11-29 | End: 2024-12-12

## 2024-11-29 RX ORDER — ACETAMINOPHEN 500MG 500 MG/1
2 TABLET, COATED ORAL
Qty: 0 | Refills: 0 | DISCHARGE
Start: 2024-11-29

## 2024-11-29 RX ADMIN — ACETAMINOPHEN 500MG 650 MILLIGRAM(S): 500 TABLET, COATED ORAL at 00:02

## 2024-11-29 RX ADMIN — GABAPENTIN 100 MILLIGRAM(S): 300 CAPSULE ORAL at 05:35

## 2024-11-29 RX ADMIN — Medication 4 MILLIGRAM(S): at 05:35

## 2024-11-29 RX ADMIN — ACETAMINOPHEN 500MG 650 MILLIGRAM(S): 500 TABLET, COATED ORAL at 05:35

## 2024-11-29 RX ADMIN — Medication 50 MICROGRAM(S): at 05:36

## 2024-11-29 RX ADMIN — METHOCARBAMOL 750 MILLIGRAM(S): 500 TABLET, FILM COATED ORAL at 05:35

## 2024-11-29 RX ADMIN — PANTOPRAZOLE SODIUM 40 MILLIGRAM(S): 40 TABLET, DELAYED RELEASE ORAL at 05:35

## 2024-11-29 RX ADMIN — ACETAMINOPHEN 500MG 650 MILLIGRAM(S): 500 TABLET, COATED ORAL at 06:31

## 2024-11-29 NOTE — DISCHARGE NOTE NURSING/CASE MANAGEMENT/SOCIAL WORK - FINANCIAL ASSISTANCE
[facility] provides services at a reduced cost to those who are determined to be eligible through [facility]’s financial assistance program. Information regarding [facility]’s financial assistance program can be found by going to [email or web address] or by calling [phone].
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
## 2024-11-29 NOTE — DISCHARGE NOTE NURSING/CASE MANAGEMENT/SOCIAL WORK - PATIENT PORTAL LINK FT
You can access the FollowMyHealth Patient Portal offered by [facility] by registering at the following website: [email or web address]. By joining [facility]’s FollowMyHealth portal, you will also be able to view your health information using other applications (apps) compatible with our system.

## 2024-11-29 NOTE — PHYSICAL THERAPY INITIAL EVALUATION ADULT - ADDITIONAL COMMENTS
as per pt, resides in a PH alone, +4 stairs to enter, 1 flight down to basement, PTA, pt amb (I), (I) with ADLs.

## 2024-12-16 ENCOUNTER — TRANSCRIPTION ENCOUNTER (OUTPATIENT)
Age: 59
End: 2024-12-16

## 2024-12-23 NOTE — PATIENT PROFILE ADULT - NSPROMEDSADMININFO_GEN_A_NUR
Prednisone 40 mg (2 tablets) for 4 additional days    Albuterol inhaler with spacer twice daily every 6 hours    Return for worsening shortness of breath, cough, coughing up blood, fevers     no concerns

## 2025-01-02 ENCOUNTER — RX RENEWAL (OUTPATIENT)
Age: 60
End: 2025-01-02

## 2025-01-09 ENCOUNTER — APPOINTMENT (OUTPATIENT)
Dept: NEUROSURGERY | Facility: CLINIC | Age: 60
End: 2025-01-09
Payer: COMMERCIAL

## 2025-01-09 VITALS
BODY MASS INDEX: 28.35 KG/M2 | WEIGHT: 160 LBS | RESPIRATION RATE: 16 BRPM | HEIGHT: 63 IN | OXYGEN SATURATION: 97 % | DIASTOLIC BLOOD PRESSURE: 80 MMHG | HEART RATE: 90 BPM | SYSTOLIC BLOOD PRESSURE: 120 MMHG

## 2025-01-09 DIAGNOSIS — M54.50 LOW BACK PAIN, UNSPECIFIED: ICD-10-CM

## 2025-01-09 PROCEDURE — 99203 OFFICE O/P NEW LOW 30 MIN: CPT

## 2025-01-25 ENCOUNTER — APPOINTMENT (OUTPATIENT)
Dept: RADIOLOGY | Facility: CLINIC | Age: 60
End: 2025-01-25

## 2025-01-31 ENCOUNTER — OUTPATIENT (OUTPATIENT)
Dept: OUTPATIENT SERVICES | Facility: HOSPITAL | Age: 60
LOS: 1 days | End: 2025-01-31
Payer: COMMERCIAL

## 2025-01-31 ENCOUNTER — APPOINTMENT (OUTPATIENT)
Dept: RADIOLOGY | Facility: CLINIC | Age: 60
End: 2025-01-31
Payer: COMMERCIAL

## 2025-01-31 DIAGNOSIS — Z98.890 OTHER SPECIFIED POSTPROCEDURAL STATES: Chronic | ICD-10-CM

## 2025-01-31 DIAGNOSIS — M54.50 LOW BACK PAIN, UNSPECIFIED: ICD-10-CM

## 2025-01-31 PROCEDURE — 72110 X-RAY EXAM L-2 SPINE 4/>VWS: CPT | Mod: 26

## 2025-01-31 PROCEDURE — 72110 X-RAY EXAM L-2 SPINE 4/>VWS: CPT

## (undated) DEVICE — PLV/PSP-TOURNIQUET #3 3010JABW: Type: DURABLE MEDICAL EQUIPMENT

## (undated) DEVICE — DRAPE U POLY BLUE 60"X60"

## (undated) DEVICE — GLV 7 PROTEXIS (WHITE)

## (undated) DEVICE — DRAPE 3/4 SHEET W REINFORCEMENT 56X77"

## (undated) DEVICE — PLV/PSP-ESU FORCE2 FIL 16736T: Type: DURABLE MEDICAL EQUIPMENT

## (undated) DEVICE — BLADE SCALPEL SAFETYLOCK #15

## (undated) DEVICE — DRSG STERISTRIPS 0.5 X 4"

## (undated) DEVICE — IMMOBILIZER HAND ALUMINUM XL

## (undated) DEVICE — SUT MONOCRYL 4-0 27" PS-2 UNDYED

## (undated) DEVICE — DRAPE SURGICAL #1010

## (undated) DEVICE — PLV-SCD MACHINE: Type: DURABLE MEDICAL EQUIPMENT

## (undated) DEVICE — VENODYNE/SCD SLEEVE CALF LARGE

## (undated) DEVICE — DRSG XEROFORM 1 X 8"

## (undated) DEVICE — DRAPE TOWEL BLUE 17" X 24"

## (undated) DEVICE — Device

## (undated) DEVICE — DRSG WEBRIL 4"

## (undated) DEVICE — TOURNIQUET CUFF 18" DUAL PORT SINGLE BLADDER LUER LOCK (BLACK)

## (undated) DEVICE — SUT PDS II 3-0 27" SH UNDYED

## (undated) DEVICE — VENODYNE/SCD SLEEVE CALF MEDIUM

## (undated) DEVICE — DRAPE LIGHT HANDLE COVER (GREEN)

## (undated) DEVICE — PACK HAND

## (undated) DEVICE — DRAPE INSTRUMENT POUCH 6.75" X 11"

## (undated) DEVICE — ELCTR BIPOLAR CORD J&J 12FT DISP

## (undated) DEVICE — IMMOBILIZER HAND ALUMINUM LARGE

## (undated) DEVICE — ELCTR BOVIE PENCIL SMOKE EVACUATION

## (undated) DEVICE — NDL HYPO SAFE 25G X 1.5" (ORANGE)

## (undated) DEVICE — SOL IRR BAG H2O 1000ML

## (undated) DEVICE — SOL IRR POUR NS 0.9% 1000ML

## (undated) DEVICE — WARMING BLANKET LOWER ADULT